# Patient Record
Sex: FEMALE | Race: WHITE | NOT HISPANIC OR LATINO | Employment: FULL TIME | URBAN - METROPOLITAN AREA
[De-identification: names, ages, dates, MRNs, and addresses within clinical notes are randomized per-mention and may not be internally consistent; named-entity substitution may affect disease eponyms.]

---

## 2017-08-01 ENCOUNTER — HOSPITAL ENCOUNTER (OUTPATIENT)
Dept: MAMMOGRAPHY | Facility: CLINIC | Age: 47
Discharge: HOME | End: 2017-08-01
Attending: OBSTETRICS & GYNECOLOGY | Admitting: OBSTETRICS & GYNECOLOGY
Payer: COMMERCIAL

## 2017-08-01 DIAGNOSIS — Z12.31 VISIT FOR SCREENING MAMMOGRAM: ICD-10-CM

## 2017-08-01 PROCEDURE — G0202 SCR MAMMO BI INCL CAD: HCPCS

## 2017-08-07 ENCOUNTER — HOSPITAL ENCOUNTER (OUTPATIENT)
Dept: MAMMOGRAPHY | Facility: CLINIC | Age: 47
Discharge: HOME | End: 2017-08-07
Attending: OBSTETRICS & GYNECOLOGY | Admitting: OBSTETRICS & GYNECOLOGY
Payer: COMMERCIAL

## 2017-08-07 ENCOUNTER — HOSPITAL ENCOUNTER (OUTPATIENT)
Dept: MAMMOGRAPHY | Facility: CLINIC | Age: 47
End: 2017-08-07
Attending: OBSTETRICS & GYNECOLOGY
Payer: COMMERCIAL

## 2017-08-07 DIAGNOSIS — R92.8 ABNORMAL MAMMOGRAM: ICD-10-CM

## 2017-08-07 PROCEDURE — G0279 TOMOSYNTHESIS, MAMMO: HCPCS

## 2017-08-07 PROCEDURE — 76642 ULTRASOUND BREAST LIMITED: CPT | Mod: LT

## 2019-06-21 ENCOUNTER — HOSPITAL ENCOUNTER (OUTPATIENT)
Dept: MAMMOGRAPHY | Facility: CLINIC | Age: 49
Discharge: HOME | End: 2019-06-21
Attending: OBSTETRICS & GYNECOLOGY | Admitting: OBSTETRICS & GYNECOLOGY
Payer: COMMERCIAL

## 2019-06-21 DIAGNOSIS — Z12.31 VISIT FOR SCREENING MAMMOGRAM: ICD-10-CM

## 2019-06-21 PROCEDURE — 77063 BREAST TOMOSYNTHESIS BI: CPT

## 2020-10-15 ENCOUNTER — HOSPITAL ENCOUNTER (OUTPATIENT)
Dept: MAMMOGRAPHY | Facility: CLINIC | Age: 50
Discharge: HOME | End: 2020-10-15
Attending: OBSTETRICS & GYNECOLOGY | Admitting: OBSTETRICS & GYNECOLOGY
Payer: COMMERCIAL

## 2020-10-15 DIAGNOSIS — Z12.31 SCREENING MAMMOGRAM, ENCOUNTER FOR: ICD-10-CM

## 2020-10-15 PROCEDURE — 77067 SCR MAMMO BI INCL CAD: CPT

## 2023-01-24 ENCOUNTER — LAB REQUISITION (OUTPATIENT)
Dept: LAB | Facility: CLINIC | Age: 53
End: 2023-01-24

## 2023-01-24 DIAGNOSIS — Z13.9 ENCOUNTER FOR SCREENING, UNSPECIFIED: ICD-10-CM

## 2023-01-24 LAB
BASOPHILS # BLD AUTO: 0 10E3/UL (ref 0–0.2)
BASOPHILS NFR BLD AUTO: 0 %
EOSINOPHIL # BLD AUTO: 0.2 10E3/UL (ref 0–0.7)
EOSINOPHIL NFR BLD AUTO: 3 %
ERYTHROCYTE [DISTWIDTH] IN BLOOD BY AUTOMATED COUNT: 12 % (ref 10–15)
HCT VFR BLD AUTO: 41.3 % (ref 35–47)
HGB BLD-MCNC: 13.6 G/DL (ref 11.7–15.7)
IMM GRANULOCYTES # BLD: 0 10E3/UL
IMM GRANULOCYTES NFR BLD: 0 %
LYMPHOCYTES # BLD AUTO: 2.7 10E3/UL (ref 0.8–5.3)
LYMPHOCYTES NFR BLD AUTO: 38 %
MCH RBC QN AUTO: 30 PG (ref 26.5–33)
MCHC RBC AUTO-ENTMCNC: 32.9 G/DL (ref 31.5–36.5)
MCV RBC AUTO: 91 FL (ref 78–100)
MONOCYTES # BLD AUTO: 0.5 10E3/UL (ref 0–1.3)
MONOCYTES NFR BLD AUTO: 8 %
NEUTROPHILS # BLD AUTO: 3.5 10E3/UL (ref 1.6–8.3)
NEUTROPHILS NFR BLD AUTO: 51 %
NRBC # BLD AUTO: 0 10E3/UL
NRBC BLD AUTO-RTO: 0 /100
PLATELET # BLD AUTO: 318 10E3/UL (ref 150–450)
RBC # BLD AUTO: 4.54 10E6/UL (ref 3.8–5.2)
WBC # BLD AUTO: 6.9 10E3/UL (ref 4–11)

## 2023-01-24 PROCEDURE — 80053 COMPREHEN METABOLIC PANEL: CPT | Performed by: OBSTETRICS & GYNECOLOGY

## 2023-01-24 PROCEDURE — 85025 COMPLETE CBC W/AUTO DIFF WBC: CPT | Performed by: OBSTETRICS & GYNECOLOGY

## 2023-01-25 LAB
ALBUMIN SERPL BCG-MCNC: 4.5 G/DL (ref 3.5–5.2)
ALP SERPL-CCNC: 59 U/L (ref 35–104)
ALT SERPL W P-5'-P-CCNC: 18 U/L (ref 10–35)
ANION GAP SERPL CALCULATED.3IONS-SCNC: 12 MMOL/L (ref 7–15)
AST SERPL W P-5'-P-CCNC: 27 U/L (ref 10–35)
BILIRUB SERPL-MCNC: 0.4 MG/DL
BUN SERPL-MCNC: 11.2 MG/DL (ref 6–20)
CALCIUM SERPL-MCNC: 9.3 MG/DL (ref 8.6–10)
CHLORIDE SERPL-SCNC: 103 MMOL/L (ref 98–107)
CREAT SERPL-MCNC: 0.7 MG/DL (ref 0.51–0.95)
DEPRECATED HCO3 PLAS-SCNC: 24 MMOL/L (ref 22–29)
GFR SERPL CREATININE-BSD FRML MDRD: >90 ML/MIN/1.73M2
GLUCOSE SERPL-MCNC: 77 MG/DL (ref 70–99)
POTASSIUM SERPL-SCNC: 3.9 MMOL/L (ref 3.4–5.3)
PROT SERPL-MCNC: 6.8 G/DL (ref 6.4–8.3)
SODIUM SERPL-SCNC: 139 MMOL/L (ref 136–145)

## 2023-02-06 ENCOUNTER — HOSPITAL ENCOUNTER (OUTPATIENT)
Dept: MAMMOGRAPHY | Facility: CLINIC | Age: 53
Discharge: HOME | End: 2023-02-06
Attending: OBSTETRICS & GYNECOLOGY | Admitting: OBSTETRICS & GYNECOLOGY
Payer: COMMERCIAL

## 2023-02-06 DIAGNOSIS — Z12.31 VISIT FOR SCREENING MAMMOGRAM: ICD-10-CM

## 2023-02-06 PROCEDURE — 77067 SCR MAMMO BI INCL CAD: CPT

## 2023-02-28 ENCOUNTER — LAB REQUISITION (OUTPATIENT)
Dept: LAB | Facility: CLINIC | Age: 53
End: 2023-02-28

## 2023-02-28 DIAGNOSIS — R87.810 CERVICAL HIGH RISK HUMAN PAPILLOMAVIRUS (HPV) DNA TEST POSITIVE: ICD-10-CM

## 2023-02-28 PROCEDURE — 88305 TISSUE EXAM BY PATHOLOGIST: CPT | Performed by: PATHOLOGY

## 2023-03-02 LAB
PATH REPORT.COMMENTS IMP SPEC: NORMAL
PATH REPORT.COMMENTS IMP SPEC: NORMAL
PATH REPORT.FINAL DX SPEC: NORMAL
PATH REPORT.GROSS SPEC: NORMAL
PATH REPORT.MICROSCOPIC SPEC OTHER STN: NORMAL
PATH REPORT.RELEVANT HX SPEC: NORMAL
PHOTO IMAGE: NORMAL

## 2023-04-16 ENCOUNTER — HEALTH MAINTENANCE LETTER (OUTPATIENT)
Age: 53
End: 2023-04-16

## 2024-02-22 ENCOUNTER — HOSPITAL ENCOUNTER (OUTPATIENT)
Dept: MAMMOGRAPHY | Facility: CLINIC | Age: 54
Discharge: HOME | End: 2024-02-22
Attending: OBSTETRICS & GYNECOLOGY | Admitting: OBSTETRICS & GYNECOLOGY
Payer: COMMERCIAL

## 2024-02-22 DIAGNOSIS — Z12.31 VISIT FOR SCREENING MAMMOGRAM: ICD-10-CM

## 2024-02-22 PROCEDURE — 77063 BREAST TOMOSYNTHESIS BI: CPT

## 2024-02-27 ENCOUNTER — TRANSFERRED RECORDS (OUTPATIENT)
Dept: HEALTH INFORMATION MANAGEMENT | Facility: CLINIC | Age: 54
End: 2024-02-27

## 2024-02-27 ENCOUNTER — LAB REQUISITION (OUTPATIENT)
Dept: LAB | Facility: CLINIC | Age: 54
End: 2024-02-27
Payer: COMMERCIAL

## 2024-02-27 ENCOUNTER — PATIENT OUTREACH (OUTPATIENT)
Dept: ONCOLOGY | Facility: CLINIC | Age: 54
End: 2024-02-27
Payer: COMMERCIAL

## 2024-02-27 ENCOUNTER — TRANSCRIBE ORDERS (OUTPATIENT)
Dept: OTHER | Age: 54
End: 2024-02-27

## 2024-02-27 DIAGNOSIS — Z01.419 ENCOUNTER FOR GYNECOLOGICAL EXAMINATION (GENERAL) (ROUTINE) WITHOUT ABNORMAL FINDINGS: ICD-10-CM

## 2024-02-27 DIAGNOSIS — Z80.3 FAMILY HISTORY OF MALIGNANT NEOPLASM OF BREAST: Primary | ICD-10-CM

## 2024-02-27 DIAGNOSIS — Z13.220 ENCOUNTER FOR SCREENING FOR LIPOID DISORDERS: ICD-10-CM

## 2024-02-27 LAB
CHOLEST SERPL-MCNC: 234 MG/DL
FASTING STATUS PATIENT QL REPORTED: YES
HDLC SERPL-MCNC: 50 MG/DL
LDLC SERPL CALC-MCNC: 156 MG/DL
NONHDLC SERPL-MCNC: 184 MG/DL
TRIGL SERPL-MCNC: 141 MG/DL

## 2024-02-27 PROCEDURE — 80061 LIPID PANEL: CPT | Mod: ORL | Performed by: OBSTETRICS & GYNECOLOGY

## 2024-02-27 PROCEDURE — 87624 HPV HI-RISK TYP POOLED RSLT: CPT | Mod: ORL | Performed by: OBSTETRICS & GYNECOLOGY

## 2024-02-27 PROCEDURE — G0145 SCR C/V CYTO,THINLAYER,RESCR: HCPCS | Mod: ORL | Performed by: OBSTETRICS & GYNECOLOGY

## 2024-02-27 NOTE — PROGRESS NOTES
Writer received Cancer Risk Management Program referral, referred for:    Family history of malignant neoplasm of breast      Reviewed for appropriate plan, and sent to New Patient Scheduling for completion.

## 2024-03-03 LAB
BKR LAB AP GYN ADEQUACY: NORMAL
BKR LAB AP GYN INTERPRETATION: NORMAL
BKR LAB AP GYN OTHER FINDINGS: NORMAL
BKR LAB AP HPV REFLEX: NORMAL
BKR LAB AP LMP: NORMAL
BKR LAB AP PREVIOUS ABNL DX: NORMAL
BKR LAB AP PREVIOUS ABNORMAL: NORMAL
PATH REPORT.COMMENTS IMP SPEC: NORMAL
PATH REPORT.COMMENTS IMP SPEC: NORMAL
PATH REPORT.RELEVANT HX SPEC: NORMAL

## 2024-03-05 LAB
HUMAN PAPILLOMA VIRUS 16 DNA: NEGATIVE
HUMAN PAPILLOMA VIRUS 18 DNA: NEGATIVE
HUMAN PAPILLOMA VIRUS FINAL DIAGNOSIS: NORMAL
HUMAN PAPILLOMA VIRUS OTHER HR: NEGATIVE

## 2024-06-22 ENCOUNTER — HEALTH MAINTENANCE LETTER (OUTPATIENT)
Age: 54
End: 2024-06-22

## 2024-08-23 NOTE — TELEPHONE ENCOUNTER
RECORDS STATUS - RISK MANAGEMENT      RECORDS RECEIVED FROM: [facility]   NOTES STATUS DETAILS   OFFICE NOTE from referring provider Req 08/23-[facility] Dr. Estefany Natarajan   MEDICATION LIST     LABS     PATHOLOGY REPORTS     ANYTHING RELATED TO DIAGNOSIS Epic Most recent 02/27/24   IMAGING (NEED IMAGES & REPORT)     MAMMOS PACS 02/22/24-09/09/11   ULTRASOUND PACS 08/07/17: [facility]

## 2024-08-26 ENCOUNTER — VIRTUAL VISIT (OUTPATIENT)
Dept: ONCOLOGY | Facility: CLINIC | Age: 54
End: 2024-08-26
Attending: OBSTETRICS & GYNECOLOGY
Payer: COMMERCIAL

## 2024-08-26 ENCOUNTER — PRE VISIT (OUTPATIENT)
Dept: ONCOLOGY | Facility: CLINIC | Age: 54
End: 2024-08-26
Payer: COMMERCIAL

## 2024-08-26 DIAGNOSIS — Z80.3 FAMILY HISTORY OF MALIGNANT NEOPLASM OF BREAST: Primary | ICD-10-CM

## 2024-08-26 DIAGNOSIS — Z84.81 FAMILY HISTORY OF CARRIER OF GENETIC DISEASE: ICD-10-CM

## 2024-08-26 DIAGNOSIS — Z80.0 FAMILY HISTORY OF MALIGNANT NEOPLASM OF PANCREAS: ICD-10-CM

## 2024-08-26 PROCEDURE — 96040 HC GENETIC COUNSELING, EACH 30 MINUTES: CPT | Mod: GT,95 | Performed by: GENETIC COUNSELOR, MS

## 2024-08-26 NOTE — PATIENT INSTRUCTIONS
Assessing Cancer Risk  Cancer is a common diagnosis which impacts many families.  Individuals may develop cancer due to environmental factors (such as exposures and lifestyle), aging, genetic predisposition, or a combination of these factors.      Only about 5-10% of cancers are thought to be due to an inherited cancer susceptibility gene.    These families often have:  Several people with the same or related types of cancer  Cancers diagnosed at a young age (before age 50)  Individuals with more than one primary cancer  Multiple generations of the family affected with cancer    Comprehensive Breast and Gynecologic Cancer Panel  We each inherit two copies of every gene in our bodies: one from our mother, and one from our father. Each gene has a specific job to do.  When a gene has a mistake or  mutation  in it, it does not work like it should.     Some people may be candidates for genetic testing of more than one gene.  For these families, genetic testing using a cancer panel may be offered. These panels will test different genes at once known to increase the risk for breast, ovarian, uterine, and/or other cancers.    This handout will review common hereditary breast and gynecologic cancer syndromes. The genes that will be discussed in this handout are: ASHWINI, BRCA1, BRCA2, BRIP1, CDH1, CHEK2, MLH1, MSH2, MSH6, PMS2, EPCAM, PTEN, PALB2, RAD51C, RAD51D, and TP53.    The purpose of this handout is to serve as a brief summary of the breast and gynecologic cancer risk genes that have published clinical management guidelines for individuals who are found to carry a mutation. Inheriting a mutation does not mean a person will develop cancer, but it does significantly increase their risk above the general population risk.     ______________________________________________________________________________    Hereditary Breast and Ovarian Cancer Syndrome (BRCA1 and BRCA2)  A single mutation in one of the copies of BRCA1 or  BRCA2 increases the risk for breast and ovarian cancer, among others.  The risk for pancreatic cancer and melanoma may also be slightly increased in some families.  The chart below shows the chance that someone with a BRCA mutation would develop cancer in his or her lifetime1,2,3,4.       Lifetime Cancer Risks    General Population BRCA1  BRCA2   Breast  12% >60% >60%   Ovarian  1-2% 39-58% 13-29%   Prostate 12% 7-26% 19-61%   Male Breast 0.1% 0.2-1.2% 1.8-7.1%   Pancreas 1-2% Up to 5% 5-10%     A person s ethnic background is also important to consider, as individuals of Ashkenazi Taoist ancestry have a higher chance of having a BRCA gene mutation.  There are three BRCA mutations that occur more frequently in this population.      Weems Syndrome (MLH1, MSH2, MSH6, PMS2, and EPCAM)  Currently five genes are known to cause Weems Syndrome: MLH1, MSH2, MSH6, PMS2, and EPCAM.  A single mutation in one of the Weems Syndrome genes increases the risk for colon, endometrial, ovarian, and stomach cancers.  Other cancers that occur less commonly in Weems Syndrome include urinary tract, skin, and brain cancers.  The chart below shows the chance that a person with Weems syndrome would develop cancer in his or her lifetime5.      Lifetime Cancer Risks    General Population Weems Syndrome   Colon 5% 10-61%   Endometrial 3% 13-57%   Ovarian 1-2% 1-38%   Stomach <1% 1-9%   *Cancer risk varies depending on Weems syndrome gene found      Cowden Syndrome (PTEN)  Cowden syndrome is a hereditary condition that increases the risk for breast, thyroid, endometrial, colon, and kidney cancer.  Cowden syndrome is caused by a mutation in the PTEN gene.  A single mutation in one of the copies of PTEN causes Cowden syndrome and increases cancer risk.  The chart below shows the chance that someone with a PTEN mutation would develop cancer in their lifetime6,7.  Other benign features seen in some individuals with Cowden syndrome include benign  skin lesions (facial papules, keratoses, lipomas), learning disability, autism, thyroid nodules, colon polyps, and larger head size.     Lifetime Cancer Risks    General Population Cowden   Breast 12% 40-60%*   Thyroid 1% Up to 38%   Renal 1-2% Up to 35%   Endometrial 3% Up to 28%   Colon 5% Up to 9%   Melanoma 2-3% Up to 6%   *Emerging data suggests the risk for breast cancer could be greater than 60%               Li-Fraumeni Syndrome (TP53)  Li-Fraumeni Syndrome (LFS) is a cancer predisposition syndrome caused by a mutation in the TP53 gene. A single mutation in one of the copies of TP53 increases the risk for multiple cancers. Individuals with LFS are at an increased risk for developing cancer at a young age. The lifetime risk for development of a LFS-associated cancer is 50% by age 30 and 90% by age 60.   Core Cancers: Sarcomas, Breast, Brain, Lung, Leukemias/Lymphomas, Adrenocortical carcinomas  Other Cancers: Gastrointestinal, Thyroid, Skin, Genitourinary       Hereditary Diffuse Gastric Cancer (CDH1)  Currently, one gene is known to cause hereditary diffuse gastric cancer (HDGC): CDH1.  Individuals with HDGC are at increased risk for diffuse gastric cancer and lobular breast cancer. Of people diagnosed with HDGC, 30-50% have a mutation in the CDH1 gene.  This suggests there are likely other genes that may cause HDGC that have not been identified yet.      Lifetime Cancer Risks    General Population HDGC   Diffuse Gastric  <1% ~80%   Breast 12% 41-60%       Additional Genes    ASHWINI  ASHWINI is a moderate-risk breast cancer gene. Women who have a mutation in ASHWINI can have between a 2-4 fold increased risk for breast cancer compared to the general population8. ASHWINI mutations have also been associated with increased risk for pancreatic cancer between 5-10%9. Individuals who inherit two ASHWINI mutations have a condition called ataxia-telangiectasia (AT).  This rare autosomal recessive condition affects the nervous system  and immune system, and is associated with progressive cerebellar ataxia beginning in childhood. Individuals with ataxia-telangiectasia often have a weakened immune system and have an increased risk for childhood cancers.    PALB2  Mutations in PALB2 have been shown to increase the risk of breast cancer up to 41-60% in some families; where individuals fall within this risk range is dependent upon family [id number]. PALB2 mutations have also been associated with increased risk for pancreatic cancer between 5-10%.  Individuals who inherit two PALB2 mutations--one from their mother and one from their father--have a condition called Fanconi Anemia.  This rare autosomal recessive condition is associated with short stature, developmental delay, bone marrow failure, and increased risk for childhood cancers.    CHEK2   CHEK2 is a moderate-risk breast cancer gene.  Women who have a mutation in CHEK2 have around a 2-4 fold increased risk for breast cancer compared to the general population, and this risk may be higher depending upon family history.11,12,13 The risk of colon cancer may be twice as high as the general population risk of colon cancer of 5%. Mutations in CHEK2 have also been shown to increase the risk of other cancers, including prostate, however these cancer risks are currently not well understood.    BRIP1, RAD51C and RAD51D  Mutations in RAD51C and RAD51D have been shown to increase the risk of ovarian cancer and breast cancer 14,. Mutations in BRIP1 have been shown to increase the risk of ovarian cancer and possibly female breast cancer 15 .       Lifetime Cancer Risk    General Population        BRIP1   RAD51C  RAD51D   Breast 12% Not well defined 20-40% 20-40%   Ovarian 1-2% 5-15% 10-15% 10-20%     ______________________________________________________________  Inheritance  All of the cancer syndromes reviewed above are inherited in an autosomal dominant pattern.  This means that if a parent has a mutation,  each of their children will have a 50% chance of inheriting that same mutation. Therefore, each child --male or female-- would have a 50% chance of being at increased risk for developing cancer.    Image obtained from Genetics Home Reference, 2013     Mutations in some genes can occur de veronica, which means that a person s mutation occurred for the first time in them and was not inherited from a parent.  Now that they have the mutation, however, it can be passed on to future generations.    Genetic Testing  Genetic testing involves a blood test and will look for any harmful mutations that are associated with increased cancer risk.  If possible, it is recommended that the person(s) who has had cancer be tested before other family members.  That person will give us the most useful information about whether or not a specific gene is associated with the cancer in the family.    Results  There are three possible results of genetic testing:  Positive--a harmful mutation was identified in one or more of the genes  Negative--no mutations were identified in any of the genes tested  Variant of unknown significance--a variation in one of the genes was identified, but it is unclear how this impacts cancer risk in the family    Advantages and Disadvantages   There are advantages and disadvantages to genetic testing.    Advantages  May clarify your cancer risk  Can help you make medical decisions  May explain the cancers in your family  May give useful information to your family members (if you share your results)    Disadvantages  Possible negative emotional impact of learning about inherited cancer risk  Uncertainty in interpreting a negative test result in some situations  Possible genetic discrimination concerns (see below)    Genetic Information Nondiscrimination Act (GEO)  The Genetic Information Nondiscrimination Act of 2008 (GEO) is a federal law that protects individuals from health insurance or employment discrimination  based on a genetic test result alone (with some exceptions, including employers with fewer than 15 employees, and ).  Although rare, GEO  does not cover discrimination protections in terms of life insurance, long term care, or disability insurances.  Visit the National Human [employer] [place] website to learn more.    Reducing Cancer Risk  All of the genes described in this handout have nationally recognized cancer screening guidelines that would be recommended for individuals who test positive.  In addition to increased cancer screening, surgeries may be offered or recommended to reduce cancer risk.  Recommendations are based upon an individual s genetic test result as well as their personal and family history of cancer.    Questions to Think About Regarding Genetic Testing:  What effect will the test result have on me and my relationship with my family members if I have an inherited gene mutation?  If I don t have a gene mutation?  Should I share my test results, and how will my family react to this news, which may also affect them?  Are my children ready to learn new information that may one day affect their own health?    Hereditary Cancer Resources    FORCE: Facing Our Risk of Cancer Empowered facingourrisk.org   Bright Pink bebrightpink.org   Li-Fraumeni Syndrome Association lfsassociation.org   PTEN World PTENworld.com   No stomach for cancer, Inc. nostomachforcancer.org   Stomach cancer relief network Scrnet.org   Collaborative Group of the Americas on Inherited Colorectal Cancer (CGA) cgaicc.com    Cancer Care cancercare.org   American Cancer Society (ACS) cancer.org   National Cancer [place] (NCI) cancer.gov     Please call us if you have any questions or concerns.   Cancer Risk Management Program [phone]-CANCER ([phone])  Tonio Neal, MS JD [facility]  [phone]  Katty Bautista, MS, JD [facility] [phone]  Stacy Sharma, MS, JD [facility]  [phone]  Emi Rose, MS, JD [facility]  [phone]  Joanna Dee,  MS, [facility]  [phone]  Jamee Haas, MS, [facility] [phone]  Vandana Ramirez, MS, [facility] [phone]    References  Radha Hayden PDP, Donte S, Karie ANTOINE, Sylwia JE, Miguel Ángel JL, cSot N, Shan H, Harika O, Herlinda A, Pasini B, Radimatilde P, Manvan S, Ronnie DM, Serra N, Rafaela E, Gypsy H, Ng E, Joanne J, Grongarrett J, Corry B, Tulinius H, Thorlacius S, Eerola H, Nevanlinna H, Daly K, Talia OP. Average risks of breast and ovarian cancer associated with BRCA1 or BRCA2 mutations detected in case series unselected for family history: a combined analysis of 222 studies. Am J Hum [place]. 2003;72:1117-30.  Lima N, Erica M, Asaf G.  BRCA1 and BRCA2 Hereditary Breast and Ovarian Cancer. Gene Reviews online. 2013.  Kelvin YC, Griffin S, Terri G, Car S. Breast cancer risk among male BRCA1 and BRCA2 mutation carriers. J Natl Cancer Inst. 2007;99:1811-4.  Yan GODFREY, Chriss I, Osvaldo J, Jabari E, Nessa ER, Diogenes F. Risk of breast cancer in male BRCA2 carriers. J Med [place]. 2010;47:710-1.  National Comprehensive Cancer Network. Clinical practice guidelines in oncology, colorectal cancer screening. Available online (registration required). 2015.  Devin MH, Tamiko J, Shun J, Rodrigo SANCHEZ, Ana MS, Eng C. Lifetime cancer risks in individuals with germline PTEN mutations. Clin Cancer Res. 2012;18:400-7.  Reynaldo R. Cowden Syndrome: A Critical Review of the Clinical Literature. J Roseanna . 2009:18:13-27.  Mirna BARBOSA, Alfred LANDIN, Rodriguez S, Wendy P, Jose T, Danilo M, Ozzie B, Mariola H, Elzbieta R, Jigar K, Moo L, Yan GODFREY, Ronnie LANDIN, Yovany DF, Maddy MR, The Breast Cancer Susceptibility Collaboration (UK) & Chris GLOVER. ASHWINI mutations that cause ataxia-telangiectasia are breast cancer susceptibility alleles. Nature Genetics. 2006;38:873-875  Ran N , Alexandra Y, Nely J, Daniel L, Edmund HARDWICK , Syl ML, Raul S, Mila AG, Elisabet S, Gonzalez ML, Roberta J , Michelle R, Dioni MILLER, Carlo  JR, Matilde VE, Jasper M, Vorenaystein B, Lavinia N, Louis RH, Mason KW, and Francis AP. ASHWINI mutations in patients with hereditary pancreatic cancer. Cancer Discover. 2012;2:41-46  Tee PEREZ., et al. Breast-Cancer Risk in Families with Mutations in PALB2. NEJM. 2014; 371(6):497-506.  CHEK2 Breast Cancer Case-Control Consortium. CHEK2*1100delC and susceptibility to breast cancer: A collaborative analysis involving 10,860 breast cancer cases and 9,065 controls from 10 studies. Am J Hum [place], 74 (2004), pp. 8960-9535  Bethany T, Leodan S, Tabitha K, et al. Spectrum of Mutations in BRCA1, BRCA2, CHEK2, and TP53 in Families at High Risk of Breast Cancer. LEONARDO. 2006;295(12):8120-9416.   Allison C, Walter D, Mingo BARBOSA, et al. Risk of breast cancer in women with a CHEK2 mutation with and without a family history of breast cancer. J Clin Oncol. 2011;29:0816-8808.  Song H, Devens E, Ramus SJ, et al. Contribution of germline mutations in the RAD51B, RAD51C, and RAD51D genes to ovarian cancer in the population. J Clin Oncol. 2015;33(26):7491-8995. Doi:10.1200/JCO.2015.61.2408.  Coral T, Roselyn DF, Ruben P, et al. Mutations in BRIP1 confer high risk of ovarian cancer. Antonia Roseanna. 2011;43(11):8460-6159. doi:10.1038/ng.955.

## 2024-08-26 NOTE — LETTER
2024    Kenia Reyes  [address] [place] [address]      Dear Kenia,    It was a pleasure speaking with you over video on 2024. Here is a copy of the progress note from our discussion. If you have any additional questions, please feel free to call.    Referring Provider: Estefany Natarajan MD    Presenting Information:   I met with Kenia for her video genetic counseling visit, through the Cancer Risk Management Program, to discuss her family history of breast and pancreatic cancer, along with the known CHEK2 mutation. Today we reviewed this history, cancer screening recommendations, and available genetic testing options.    Personal History:  Kenia is a 53 year old year old female. She does not have any personal history of cancer. She had her first menstrual period at age 13, her first child at age 33, and is premenopausal. Kenia has her ovaries, fallopian tubes and uterus in place, and she has had no ovarian cancer screening to date. She reports that she has not used hormone replacement therapy.      She has regular clinical breast exams and mammograms; her most recent mammogram in 2024 was normal. Kenia reported beginning having colonoscopies at the age of 50. Her most recent colonoscopy was reported around 2021 was normal and follow-up was recommended in 10 years. She does not regularly do any other cancer screening at this time.     Family History: (Please see scanned pedigree for detailed family history information)  Kenia's paternal half-sister was diagnosed with breast cancer at age 37. 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 I received a family letter that explained this result, so I do not know what other genes she was tested for.  Kenia's father was diagnosed with non-Hodgkin's lymphoma at age 45 and  at age 49.  A maternal aunt was diagnosed with breast cancer at age 50 and pancreatic cancer  at age 62.  There is no other reported family history of cancer on either side of the family, however, her father was adopted and there is no information about his biological family.  Her maternal ethnicity is [place] and [place]. Her paternal ethnicity is White. There is no known Ashkenazi Taoism ancestry on either side of her family. There is no reported consanguinity.    Discussion:  Kenia's family history of breast and pancreatic cancer is suggestive of a hereditary cancer syndrome.  We reviewed the features of sporadic, familial, and hereditary cancers. We discussed that mutations in either BRCA1 or BRCA2 could be possible hereditary explanations for her family history of cancer. Mutations in the BRCA1 or BRCA2 gene are known to cause Hereditary Breast and Ovarian Cancer Syndrome (HBOC). HBOC typically presents with multiple family members diagnosed with breast cancer before age 50 and/or ovarian cancer. Other cancer risks associated with HBOC include male breast cancer, prostate cancer, pancreatic cancer, and melanoma.   Of note, Kenia's paternal half sister was found to have a CHEK2 mutation. 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 Kenia stated that this sister reported that this is coming from their father. I asked further about if her half-sister's mother had genetic testing to prove that she did not have the mutation, but Kenia was not sure. She just knew that her sister reported that this is coming from their father. Given this, Kenia would have a 50% chance of inheriting the same CHEK2 mutation.  We discussed the natural history and genetics of hereditary cancer. A detailed handout regarding hereditary cancer, along with the other information we discussed, will be mailed to Kenia at the end of our appointment today and can be found in the after visit summary. Topics included: inheritance pattern, cancer risks, cancer screening recommendations, and also risks, benefits and limitations of testing.  Based on  her personal and family history, Kenia meets current National Comprehensive Cancer Network (NCCN) criteria for genetic testing of CHEK2.  Based on her personal and family history, Kenia meets current National Comprehensive Cancer Network (NCCN) criteria for genetic testing of BRCA1/2 along with other high-penetrance breast cancer susceptibility genes (I.e. CDH1, PALB2, PTEN, and TP53) given her maternal aunt's breast cancer diagnosis.  We discussed that there are additional genes that could cause increased risk for breast and/or pancreatic cancer. As many of these genes present with overlapping features in a family and accurate cancer risk cannot always be established based upon the pedigree analysis alone, it would be reasonable for Kenia to consider panel genetic testing to analyze multiple genes at once.  Genetic testing is available for 48 genes associated with hereditary cancer: Common Hereditary Cancers panel (APC, ASHWINI, AXIN2, BAP1, BARD1, BMPR1A, BRCA1, BRCA2, BRIP1, CDH1, CDK4, CDKN2A, CHEK2, CTNNA1, DICER1, EPCAM, FH, GREM1, HOXB13, KIT, MBD4, MEN1, MLH1, MSH2, MSH3, MSH6, MUTYH, NF1, NTHL1, PALB2, PDGFRA, PMS2, POLD1, POLE, PTEN, RAD51C, RAD51D, SDHA, SDHB, SDHC, SDHD, SMAD4, SMARCA4, STK11, TP53, TSC1, TSC2, and VHL).  We discussed that many of the genes in the Common Hereditary Cancers panel are associated with specific hereditary cancer syndromes and published management guidelines: Hereditary Breast and Ovarian Cancer syndrome (BRCA1, BRCA2), Weems syndrome (MLH1, MSH2, MSH6, PMS2, EPCAM), Familial Adenomatous Polyposis (APC), Hereditary Diffuse Gastric Cancer (CDH1), Familial Atypical Multiple Mole Melanoma syndrome (CDK4, CDKN2A), Juvenile Polyposis syndrome (BMPR1A, SMAD4), Cowden syndrome (PTEN), Li Fraumeni syndrome (TP53), Peutz-Jeghers syndrome (STK11), MUTYH Associated Polyposis (MUTYH), Tuberous Sclerosis complex (TSC1, TSC2), Neurofibromatosis type 1 (NF1), Hereditary Leiomyomatosis and Renal  Cell Carcinoma (FH), Multiple Endocrine Neoplasia type 1 (MEN1), Hereditary Paraganglioma and Pheochromocytoma (SDHA, SDHB, SDHC, SDHD), and von Hippel-Lindau (VHL).   The ASHWINI, AXIN2, BRIP1, CHEK2, GREM1, MSH3, NTHL1, PALB2, POLD1, POLE, RAD51C, and RAD51D genes are associated with increased cancer risk and have published management guidelines for certain cancers.    The remaining genes (BAP1, BARD1, CTNNA1, DICER1, HOXB13, KIT, MBD4, PDGFRA, and SMARCA4) are associated with increased cancer risk and may allow us to make medical recommendations when mutations are identified.    Kenia stated that she would prefer to submit a saliva kit for her genetic testing. [facility] will send a kit directly to her home with directions on how to collect a saliva sample. We discussed that there is a small chance for sample failure due to contamination of the sample. To help minimize this, she should follow the directions that are sent with the kit. Kenia verbalized understanding of this. Once the sample is collected, she will send it to [employer] using the return envelope and prepaid shipping label.   Verbal consent was given over video and written on the consent form. Turnaround time is approximately 4 weeks once the lab receives the sample.  Should Kenia have any questions regarding the billing for her genetic testing, she should contact [facility]'s billing department at [email or web address] or call them at [phone].  The possible outcomes of positive, negative, and uncertain were discussed with Kenia. A detailed handout that explains this in detail was provided to the patient.  Medical Management: For Kenia, we reviewed that the information from genetic testing may determine:  additional cancer screening for which Kenia may qualify (i.e. mammogram and breast MRI, more frequent colonoscopies, more frequent dermatologic exams, etc.),  options for risk reducing surgeries Kenia could consider (i.e. bilateral mastectomy, surgery to  remove her ovaries and/or uterus, etc.),    and targeted chemotherapies if she were to develop certain cancers in the future (i.e. immunotherapy for individuals with Weems syndrome, PARP inhibitors, etc.).   These recommendations and possible targeted chemotherapies will be discussed in detail once genetic testing is completed.     Plan:  1) Today Kenia elected to proceed with the Common Hereditary Cancers panel through [facility].  2) A copy of the consent form and the after visit summary will be sent to Kenia.  3) This information should be available in approximately 4 weeks, once the lab receives the sample.  4) I will call Kenia with the results once they become available.    Time spent on video: 38 minutes    Tonio Neal MS, [facility]  Licensed, Certified Genetic Counselor

## 2024-08-26 NOTE — Clinical Note
"8/26/2024      Kenia Reyes  [address] [place] [address]      Dear Colleague,    Thank you for referring your patient, Kenia Reyes, to the [facility]. Please see a copy of my visit note below.    Virtual Visit Details    Type of service:  Video Visit     Originating Location (pt. Location): {video visit patient location:699508::\"Home\"}  {PROVIDER LOCATION On-site should be selected for visits conducted from your clinic location or adjoining [facility] hospital, academic office, or other nearby [facility] building. Off-site should be selected for all other provider locations, including home:427207}  Distant Location (provider location):  {virtual location provider:554207}  Platform used for Video Visit: {Virtual Visit Platforms:286993::\"[facility]\"}      Again, thank you for allowing me to participate in the care of your patient.        Sincerely,        Tonio Neal, GC  "

## 2024-08-26 NOTE — NURSING NOTE
Current patient location:  [place], [place]      Is the patient currently in the state of [place]? YES    Visit mode:VIDEO    If the visit is dropped, the patient can be reconnected by: VIDEO VISIT: Send to e-mail at: [email or web address]    Will anyone else be joining the visit? NO  (If patient encounters technical issues they should call [phone])    How would you like to obtain your AVS? MyChart    Are changes needed to the allergy or medication list? N/A    Reason for visit: Consult     Leslye CARTY

## 2024-08-26 NOTE — PROGRESS NOTES
2024    Referring Provider: Estefany Natarajan MD    Presenting Information:   I met with Kenia for her video genetic counseling visit, through the Cancer Risk Management Program, to discuss her family history of breast and pancreatic cancer, along with the known CHEK2 mutation. Today we reviewed this history, cancer screening recommendations, and available genetic testing options.    Personal History:  Kenia is a 53 year old year old female. She does not have any personal history of cancer. She had her first menstrual period at age 13, her first child at age 33, and is premenopausal. Kenia has her ovaries, fallopian tubes and uterus in place, and she has had no ovarian cancer screening to date. She reports that she has not used hormone replacement therapy.      She has regular clinical breast exams and mammograms; her most recent mammogram in 2024 was normal. Kenia reported beginning having colonoscopies at the age of 50. Her most recent colonoscopy was reported around 2021 was normal and follow-up was recommended in 10 years. She does not regularly do any other cancer screening at this time.     Family History: (Please see scanned pedigree for detailed family history information)  Kenia's paternal half-sister was diagnosed with breast cancer at age 37. 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 I received a family letter that explained this result, so I do not know what other genes she was tested for.  Kenia's father was diagnosed with non-Hodgkin's lymphoma at age 45 and  at age 49.  A maternal aunt was diagnosed with breast cancer at age 50 and pancreatic cancer at age 62.  There is no other reported family history of cancer on either side of the family, however, her father was adopted and there is no information about his biological family.  Her maternal ethnicity is [place] and [place]. Her paternal ethnicity is White. There  is no known Ashkenazi Baptism ancestry on either side of her family. There is no reported consanguinity.    Discussion:  Kenia's family history of breast and pancreatic cancer is suggestive of a hereditary cancer syndrome.  We reviewed the features of sporadic, familial, and hereditary cancers. We discussed that mutations in either BRCA1 or BRCA2 could be possible hereditary explanations for her family history of cancer. Mutations in the BRCA1 or BRCA2 gene are known to cause Hereditary Breast and Ovarian Cancer Syndrome (HBOC). HBOC typically presents with multiple family members diagnosed with breast cancer before age 50 and/or ovarian cancer. Other cancer risks associated with HBOC include male breast cancer, prostate cancer, pancreatic cancer, and melanoma.   Of note, Kenia's paternal half sister was found to have a CHEK2 mutation. 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 Kenia stated that this sister reported that this is coming from their father. I asked further about if her half-sister's mother had genetic testing to prove that she did not have the mutation, but Kenia was not sure. She just knew that her sister reported that this is coming from their father. Given this, Kenia would have a 50% chance of inheriting the same CHEK2 mutation.  We discussed the natural history and genetics of hereditary cancer. A detailed handout regarding hereditary cancer, along with the other information we discussed, will be mailed to Kenia at the end of our appointment today and can be found in the after visit summary. Topics included: inheritance pattern, cancer risks, cancer screening recommendations, and also risks, benefits and limitations of testing.  Based on her personal and family history, Kenia meets current National Comprehensive Cancer Network (NCCN) criteria for genetic testing of CHEK2.  Based on her personal and family history, Kenia meets current National Comprehensive Cancer Network (NCCN) criteria for genetic testing  of BRCA1/2 along with other high-penetrance breast cancer susceptibility genes (I.e. CDH1, PALB2, PTEN, and TP53) given her maternal aunt's breast cancer diagnosis.  We discussed that there are additional genes that could cause increased risk for breast and/or pancreatic cancer. As many of these genes present with overlapping features in a family and accurate cancer risk cannot always be established based upon the pedigree analysis alone, it would be reasonable for Kenia to consider panel genetic testing to analyze multiple genes at once.  Genetic testing is available for 48 genes associated with hereditary cancer: Common Hereditary Cancers panel (APC, ASHWINI, AXIN2, BAP1, BARD1, BMPR1A, BRCA1, BRCA2, BRIP1, CDH1, CDK4, CDKN2A, CHEK2, CTNNA1, DICER1, EPCAM, FH, GREM1, HOXB13, KIT, MBD4, MEN1, MLH1, MSH2, MSH3, MSH6, MUTYH, NF1, NTHL1, PALB2, PDGFRA, PMS2, POLD1, POLE, PTEN, RAD51C, RAD51D, SDHA, SDHB, SDHC, SDHD, SMAD4, SMARCA4, STK11, TP53, TSC1, TSC2, and VHL).  We discussed that many of the genes in the Common Hereditary Cancers panel are associated with specific hereditary cancer syndromes and published management guidelines: Hereditary Breast and Ovarian Cancer syndrome (BRCA1, BRCA2), Weems syndrome (MLH1, MSH2, MSH6, PMS2, EPCAM), Familial Adenomatous Polyposis (APC), Hereditary Diffuse Gastric Cancer (CDH1), Familial Atypical Multiple Mole Melanoma syndrome (CDK4, CDKN2A), Juvenile Polyposis syndrome (BMPR1A, SMAD4), Cowden syndrome (PTEN), Li Fraumeni syndrome (TP53), Peutz-Jeghers syndrome (STK11), MUTYH Associated Polyposis (MUTYH), Tuberous Sclerosis complex (TSC1, TSC2), Neurofibromatosis type 1 (NF1), Hereditary Leiomyomatosis and Renal Cell Carcinoma (FH), Multiple Endocrine Neoplasia type 1 (MEN1), Hereditary Paraganglioma and Pheochromocytoma (SDHA, SDHB, SDHC, SDHD), and von Hippel-Lindau (VHL).   The ASHWINI, AXIN2, BRIP1, CHEK2, GREM1, MSH3, NTHL1, PALB2, POLD1, POLE, RAD51C, and RAD51D genes are  associated with increased cancer risk and have published management guidelines for certain cancers.    The remaining genes (BAP1, BARD1, CTNNA1, DICER1, HOXB13, KIT, MBD4, PDGFRA, and SMARCA4) are associated with increased cancer risk and may allow us to make medical recommendations when mutations are identified.    Kenia stated that she would prefer to submit a saliva kit for her genetic testing. [facility] will send a kit directly to her home with directions on how to collect a saliva sample. We discussed that there is a small chance for sample failure due to contamination of the sample. To help minimize this, she should follow the directions that are sent with the kit. Kenia verbalized understanding of this. Once the sample is collected, [facility] using the return envelope and prepaid shipping label.   Verbal consent was given over video and written on the consent form. Turnaround time is approximately 4 weeks once the lab receives the sample.  Should Kenia have any questions regarding the billing for her genetic testing, she should contact [facility]'s billing department at [email or web address] or call them at [phone].  The possible outcomes of positive, negative, and uncertain were discussed with Kenia. A detailed handout that explains this in detail was provided to the patient.  Medical Management: For Kenia, we reviewed that the information from genetic testing may determine:  additional cancer screening for which Kenia may qualify (i.e. mammogram and breast MRI, more frequent colonoscopies, more frequent dermatologic exams, etc.),  options for risk reducing surgeries Kenia could consider (i.e. bilateral mastectomy, surgery to remove her ovaries and/or uterus, etc.),    and targeted chemotherapies if she were to develop certain cancers in the future (i.e. immunotherapy for individuals with Weems syndrome, PARP inhibitors, etc.).   These recommendations and possible targeted chemotherapies  will be discussed in detail once genetic testing is completed.     Plan:  1) Today Kenia elected to proceed with the Common Hereditary Cancers panel through [facility].  2) A copy of the consent form and the after visit summary will be sent to Kenia.  3) This information should be available in approximately 4 weeks, once the lab receives the sample.  4) I will call Kenia with the results once they become available.    Time spent on video: 38 minutes    Tonio Neal MS, [facility]  Licensed, Certified Genetic Counselor      Virtual Visit Details    Type of service:  Video Visit     Originating Location (pt. Location): Home  Distant Location (provider location):  Off-site  Platform used for Video Visit: Charis

## 2024-09-30 DIAGNOSIS — Z80.3 FAMILY HISTORY OF MALIGNANT NEOPLASM OF BREAST: Primary | ICD-10-CM

## 2024-09-30 DIAGNOSIS — Z84.81 FAMILY HISTORY OF CARRIER OF GENETIC DISEASE: ICD-10-CM

## 2024-09-30 DIAGNOSIS — Z80.0 FAMILY HISTORY OF MALIGNANT NEOPLASM OF PANCREAS: ICD-10-CM

## 2024-10-18 ENCOUNTER — VIRTUAL VISIT (OUTPATIENT)
Dept: ONCOLOGY | Facility: CLINIC | Age: 54
End: 2024-10-18
Attending: GENETIC COUNSELOR, MS
Payer: COMMERCIAL

## 2024-10-18 ENCOUNTER — PATIENT OUTREACH (OUTPATIENT)
Dept: ONCOLOGY | Facility: CLINIC | Age: 54
End: 2024-10-18

## 2024-10-18 DIAGNOSIS — Z84.81 FAMILY HISTORY OF CARRIER OF GENETIC DISEASE: ICD-10-CM

## 2024-10-18 DIAGNOSIS — Z80.3 FAMILY HISTORY OF MALIGNANT NEOPLASM OF BREAST: ICD-10-CM

## 2024-10-18 DIAGNOSIS — Z80.0 FAMILY HISTORY OF MALIGNANT NEOPLASM OF PANCREAS: ICD-10-CM

## 2024-10-18 DIAGNOSIS — Z15.09 MONOALLELIC MUTATION OF CHEK2 GENE: Primary | ICD-10-CM

## 2024-10-18 PROCEDURE — 999N000069 HC STATISTIC GENETIC COUNSELING, < 16 MIN: Mod: GT,95 | Performed by: GENETIC COUNSELOR, MS

## 2024-10-18 NOTE — NURSING NOTE
Current patient location: [address] [address]    Is the patient currently in the state of [place]? YES    Visit mode:VIDEO    If the visit is dropped, the patient can be reconnected by: VIDEO VISIT: Send to e-mail at: [email or web address]    Will anyone else be joining the visit? NO  (If patient encounters technical issues they should call [phone]    Are changes needed to the allergy or medication list? N/A    Are refills needed on medications prescribed by this physician? NO    Rooming Documentation:  Questionnaire(s) not done per department protocol    Reason for visit: RECHECK    Kayleen BRODYF

## 2024-10-18 NOTE — LETTER
"October 18, 2024    Kenia Reyes  [address] [facility] [address]      Dear Kenia,    It was a pleasure speaking with you over video on 10/18/2024. Here is a copy of the progress note from our discussion. If you have any additional questions, please feel free to call.    Referring Provider: Estefany Natarajan MD    Presenting Information:   I spoke to Kenia by video today to discuss her genetic testing results. A saliva kit was sent to her house on 8/26/24. The Common Hereditary Cancers panel was ordered from [employer]. This testing was done because of Kenia's family history of breast and pancreatic cancer, along with the known CHEK2 mutation in the family..     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 We discussed that this mutation is associated with an increased risk for breast cancer. We discussed the impact of this testing on Kenia in detail.     Of note, Kenia is negative for any pathogenic (harmful) mutations in APC, ASHWINI, AXIN2, BAP1, BARD1, BMPR1A, BRCA1, BRCA2, BRIP1, CDH1, CDK4, CDKN2A, CTNNA1, DICER1, EPCAM, FH, GREM1, HOXB13, KIT, MBD4, MEN1, MLH1, MSH2, MSH3, MSH6, MUTYH, NF1, NTHL1, PALB2, PDGFRA, PMS2, POLD1, POLE, PTEN, RAD51C, RAD51D, SDHA, SDHB, SDHC, SDHD, SMAD4, SMARCA4, STK11, TP53, TSC1, TSC2, and VHL. No pathogenic mutations were found in any of the other 47 genes analyzed. This test involved sequencing and deletion/duplication analysis of all genes with the exceptions of EPCAM and GREM1 (deletions/duplications only) and SDHA (sequencing only).  We reviewed the autosomal dominant inheritance of these genes. Kenia cannot pass on a mutation in any of these genes to her children based on this test result. Mutations in these genes do not skip generations.      A copy of the test report can be found in the Laboratory tab, dated 9/20/24, and named \"LABORATORY MISCELLANEOUS RESULT.\"  The report is " scanned in as a linked document.    Cancer Risks:    Mutations in the CHEK2 gene are associated with a moderate risk of breast cancer. Of note, studies investigating the association between specific CHEK2 variants and cancer risk have been primarily based on the variant 1100delC, which is commonly found in individuals of  ancestry.  The lifetime breast cancer risk for women who carry one CHEK2 mutation is approximately 23-27%, with some studies suggesting the risk could be as high as 40%.  This is compared to the lifetime population risk of breast cancer of 12.8%.  These risks are likely influenced by family history, in which women with a family history of breast cancer are at higher risk than those without.   Based on new data, colorectal cancer is no longer associated with CHEK2 mutations.  We also discussed the possible association of CHEK2 mutations with increased risk for prostate, melanoma, thyroid, and other cancers, however data is still limited in this area. No confirmed risk numbers are available for these additional cancers, though they have been reported in families that have a CHEK2 mutation.    Of note, we reviewed that the CHEK2 gene is currently considered a moderate-risk gene. This means that mutations in this gene increase the risk for certain cancers, but are unlikely to be the single cause for an individual's cancer. There are likely other genetic and/or environmental risk factors that, in combination with a CHEK2 gene mutation, cause cancer.    Cancer Screening and Prevention:  The following screening is recommended for individuals who have a mutation in the CHEK2 gene, per current National Comprehensive Cancer Network (NCCN) guidelines.  Women with CHEK2 mutations qualify for high risk breast screening.  High risk breast cancer screening recommendations include annual mammograms and annual breast MRI's, alternating every 6 months.    It is typically recommended that mammogram screening  begin at age 40, though this can be discussed in more detail with a woman's medical providers. Breast MRI with and without contrast may be considered starting at age 30-35.    Men with CHEK2 mutations can consider prostate cancer screening starting at age 40.  There are currently no other specific cancer screening guidelines for other cancers potentially associated with a CHEK2 mutation. As such, additional screening should be based on family history.    Other screening based on Kenia's personal and family history:  Other population cancer screening options, such as those recommended by the American Cancer Society and the National Comprehensive Cancer Network (NCCN), are also appropriate for Kenia and her family. These screening recommendations may change if there are changes to Kenia's personal and/or family history of cancer. Final screening recommendations should be made in consultation with each individual's primary care provider.    We discussed that Kenia could participate in our Cancer Risk Management Program in which our nursing specialist provides an individual screening plan and assists with medical management. A referral was placed to see JESSI Ortiz for this service.    Of note, the above information is based on our current understanding of Kenia's genetic findings. Kenia is encouraged to reach out to me regularly regarding any pertinent updates to her personal and/or family history of cancer, as our understanding of the genetic findings in her family may change over time.     Implications for Family Members:  We reviewed that mutations in the CHEK2 gene are inherited in an autosomal dominant pattern. This means that each of Kenia's children have a 50% chance of inheriting the same mutation. Likewise, each of her siblings has a 50% risk of having the same mutation. I am happy to help her relatives connect with a genetic counselor in their area if they would like to discuss  testing.      Additional Testing Considerations:  Even though Kenia's genetic testing result was positive, other relatives may carry the same CHEK2 mutation or a different gene mutation associated with breast and/or pancreatic cancer. Genetic counseling is recommended for her mother, brother, and children to discuss genetic testing options. If any of these relatives do pursue genetic testing, Kenia is encouraged to contact me so that we may review the impact of their test results on her.    Plan:  1.  A copy of the test results will be sent to Kenia.  2.  She plans to follow up with her other providers.  3.  A referral was placed for Kenia to meet with JESSI Ortiz to discuss screening associated with a CHEK2 mutation.    If Kenia has additional questions, I encouraged her to contact me directly via [employer].     Time spent on video: 10 minutes    Tonio Neal MS, [facility] [place]  Licensed, Certified Genetic Counselor

## 2024-10-18 NOTE — PROGRESS NOTES
"10/18/2024    Referring Provider: Estefany Natarajan MD    Presenting Information:   I spoke to Kenia by video today to discuss her genetic testing results. A saliva kit was sent to her house on 8/26/24. The Common Hereditary Cancers panel was ordered from [employer] [facility]. This testing was done because of Kenia's family history of breast and pancreatic cancer, along with the known CHEK2 mutation in the family..     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 We discussed that this mutation is associated with an increased risk for breast cancer. We discussed the impact of this testing on Kenia in detail.     Of note, Kenia is negative for any pathogenic (harmful) mutations in APC, ASHWINI, AXIN2, BAP1, BARD1, BMPR1A, BRCA1, BRCA2, BRIP1, CDH1, CDK4, CDKN2A, CTNNA1, DICER1, EPCAM, FH, GREM1, HOXB13, KIT, MBD4, MEN1, MLH1, MSH2, MSH3, MSH6, MUTYH, NF1, NTHL1, PALB2, PDGFRA, PMS2, POLD1, POLE, PTEN, RAD51C, RAD51D, SDHA, SDHB, SDHC, SDHD, SMAD4, SMARCA4, STK11, TP53, TSC1, TSC2, and VHL. No pathogenic mutations were found in any of the other 47 genes analyzed. This test involved sequencing and deletion/duplication analysis of all genes with the exceptions of EPCAM and GREM1 (deletions/duplications only) and SDHA (sequencing only).  We reviewed the autosomal dominant inheritance of these genes. Kenia cannot pass on a mutation in any of these genes to her children based on this test result. Mutations in these genes do not skip generations.      A copy of the test report can be found in the Laboratory tab, dated 9/20/24, and named \"LABORATORY MISCELLANEOUS RESULT.\"  The report is scanned in as a linked document.    Cancer Risks:    Mutations in the CHEK2 gene are associated with a moderate risk of breast cancer. Of note, studies investigating the association between specific CHEK2 variants and cancer risk have been primarily based on the variant " 1100delC, which is commonly found in individuals of  ancestry.  The lifetime breast cancer risk for women who carry one CHEK2 mutation is approximately 23-27%, with some studies suggesting the risk could be as high as 40%.  This is compared to the lifetime population risk of breast cancer of 12.8%.  These risks are likely influenced by family history, in which women with a family history of breast cancer are at higher risk than those without.   Based on new data, colorectal cancer is no longer associated with CHEK2 mutations.  We also discussed the possible association of CHEK2 mutations with increased risk for prostate, melanoma, thyroid, and other cancers, however data is still limited in this area. No confirmed risk numbers are available for these additional cancers, though they have been reported in families that have a CHEK2 mutation.    Of note, we reviewed that the CHEK2 gene is currently considered a moderate-risk gene. This means that mutations in this gene increase the risk for certain cancers, but are unlikely to be the single cause for an individual's cancer. There are likely other genetic and/or environmental risk factors that, in combination with a CHEK2 gene mutation, cause cancer.    Cancer Screening and Prevention:  The following screening is recommended for individuals who have a mutation in the CHEK2 gene, per current National Comprehensive Cancer Network (NCCN) guidelines.  Women with CHEK2 mutations qualify for high risk breast screening.  High risk breast cancer screening recommendations include annual mammograms and annual breast MRI's, alternating every 6 months.    It is typically recommended that mammogram screening begin at age 40, though this can be discussed in more detail with a woman's medical providers. Breast MRI with and without contrast may be considered starting at age 30-35.    Men with CHEK2 mutations can consider prostate cancer screening starting at age 40.  There are  currently no other specific cancer screening guidelines for other cancers potentially associated with a CHEK2 mutation. As such, additional screening should be based on family history.    Other screening based on Kenia's personal and family history:  Other population cancer screening options, such as those recommended by the American Cancer Society and the National Comprehensive Cancer Network (NCCN), are also appropriate for Kenia and her family. These screening recommendations may change if there are changes to Kenia's personal and/or family history of cancer. Final screening recommendations should be made in consultation with each individual's primary care provider.    We discussed that Kenia could participate in our Cancer Risk Management Program in which our nursing specialist provides an individual screening plan and assists with medical management. A referral was placed to see JESSI Ortiz for this service.    Of note, the above information is based on our current understanding of Kenia's genetic findings. Kenia is encouraged to reach out to me regularly regarding any pertinent updates to her personal and/or family history of cancer, as our understanding of the genetic findings in her family may change over time.     Implications for Family Members:  We reviewed that mutations in the CHEK2 gene are inherited in an autosomal dominant pattern. This means that each of Kenia's children have a 50% chance of inheriting the same mutation. Likewise, each of her siblings has a 50% risk of having the same mutation. I am happy to help her relatives connect with a genetic counselor in their area if they would like to discuss testing.    Additional Testing Considerations:  Even though Kenia's genetic testing result was positive, other relatives may carry the same CHEK2 mutation or a different gene mutation associated with breast and/or pancreatic cancer. Genetic counseling is recommended for her mother, brother, and  children to discuss genetic testing options. If any of these relatives do pursue genetic testing, Kenia is encouraged to contact me so that we may review the impact of their test results on her.    Plan:  1.  A copy of the test results will be sent to Kenia.  2.  She plans to follow up with her other providers.  3.  A referral was placed for Kenia to meet with JESSI Ortiz to discuss screening associated with a CHEK2 mutation.    If Kenia has additional questions, I encouraged her to contact me directly via [employer].     Time spent on video: 10 minutes    Tonio Neal MS, [facility] [place]  Licensed, Certified Genetic Counselor      Virtual Visit Details    Type of service:  Video Visit     Originating Location (pt. Location): Home  Distant Location (provider location):  Off-site  Platform used for Video Visit: Charis

## 2024-10-18 NOTE — PROGRESS NOTES
New Patient Oncology Nurse Navigator Note     Referring provider:   Tonio Neal GC  Cancer Risk [facility] [phone]     Referred to (specialty):  Genetic Counseling    Date Referral Received: 10/18/2024     Evaluation for: Please schedule with Susan Lee to discuss screening for CHEK2 mutation  10/18/2024:  Tonio Neal GC note is book marked

## 2024-10-18 NOTE — Clinical Note
"10/18/2024      Kenia Reyes  [address] [facility] [address]      Dear Colleague,    Thank you for referring your patient, Kenia Reyes, to the [facility]. Please see a copy of my visit note below.    Virtual Visit Details    Type of service:  Video Visit     Originating Location (pt. Location): {video visit patient location:692817::\"Home\"}  {PROVIDER LOCATION On-site should be selected for visits conducted from your clinic location or adjoining [facility] hospital, academic office, or other nearby [facility] building. Off-site should be selected for all other provider locations, including home:[id number]}  Distant Location (provider location):  {virtual location provider:035793}  Platform used for Video Visit: {Virtual Visit Platforms:188137::\""[employer]"\"}    10/18/2024    Referring Provider: Estefany Natarajan MD    Presenting Information:   I spoke to Kenia by video today to discuss her genetic testing results. A saliva kit was sent to her house on 8/26/24. The Common Hereditary Cancers panel was ordered from [employer]. This testing was done because of Kenia's family history of breast and pancreatic cancer, along with the known CHEK2 mutation in the family..     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 Specifically her mutation is called ***. We discussed that this mutation is associated with an increased risk for breast cancer. We discussed the impact of this testing on Kenia in detail.     Of note, Kenia is negative for any pathogenic (harmful) mutations in APC, ASHWINI, AXIN2, BAP1, BARD1, BMPR1A, BRCA1, BRCA2, BRIP1, CDH1, CDK4, CDKN2A, CTNNA1, DICER1, EPCAM, FH, GREM1, HOXB13, KIT, MBD4, MEN1, MLH1, MSH2, MSH3, MSH6, MUTYH, NF1, NTHL1, PALB2, PDGFRA, PMS2, POLD1, POLE, PTEN, RAD51C, RAD51D, SDHA, SDHB, SDHC, SDHD, SMAD4, SMARCA4, STK11, TP53, TSC1, TSC2, and VHL. No pathogenic mutations were found in any of the other 47 genes analyzed. This test involved " "sequencing and deletion/duplication analysis of all genes with the exceptions of EPCAM and GREM1 (deletions/duplications only) and SDHA (sequencing only).  We reviewed the autosomal dominant inheritance of these genes. Kenia cannot pass on a mutation in any of these genes to her children based on this test result. Mutations in these genes do not skip generations.      A copy of the test report can be found in the Laboratory tab, dated ***, and named \"LABORATORY MISCELLANEOUS RESULT.\"  The report is scanned in as a linked document.    Cancer Risks:    Mutations in the CHEK2 gene are associated with a moderate risk of breast cancer. Of note, studies investigating the association between specific CHEK2 variants and cancer risk have been primarily based on the variant 1100delC, which is commonly found in individuals of  ancestry.  The lifetime breast cancer risk for women who carry one CHEK2 mutation is approximately 23-27%, with some studies suggesting the risk could be as high as 40%.  This is compared to the lifetime population risk of breast cancer of 12.8%.  These risks are likely influenced by family history, in which women with a family history of breast cancer are at higher risk than those without.   Based on new data, colorectal cancer is no longer associated with CHEK2 mutations.  We also discussed the possible association of CHEK2 mutations with increased risk for prostate, melanoma, thyroid, and other cancers, however data is still limited in this area. No confirmed risk numbers are available for these additional cancers, though they have been reported in families that have a CHEK2 mutation.    Of note, we reviewed that the CHEK2 gene is currently considered a moderate-risk gene. This means that mutations in this gene increase the risk for certain cancers, but are unlikely to be the single cause for an individual's cancer. There are likely other genetic and/or environmental risk factors that, in " combination with a CHEK2 gene mutation, cause cancer.    Cancer Screening and Prevention:  The following screening is recommended for individuals who have a mutation in the CHEK2 gene, per current National Comprehensive Cancer Network (NCCN) guidelines.  Women with CHEK2 mutations qualify for high risk breast screening.  High risk breast cancer screening recommendations include annual mammograms and annual breast MRI's, alternating every 6 months.    It is typically recommended that mammogram screening begin at age 40, though this can be discussed in more detail with a woman's medical providers. Breast MRI with and without contrast may be considered starting at age 30-35.    Men with CHEK2 mutations can consider prostate cancer screening starting at age 40.  There are currently no other specific cancer screening guidelines for other cancers potentially associated with a CHEK2 mutation. As such, additional screening should be based on family history.    Other screening based on Kenia's personal and family history:  Other population cancer screening options, such as those recommended by the American Cancer Society and the National Comprehensive Cancer Network (NCCN), are also appropriate for Kenia and her family. These screening recommendations may change if there are changes to Kenia's personal and/or family history of cancer. Final screening recommendations should be made in consultation with each individual's primary care provider.    We discussed that Kenia could participate in our Cancer Risk Management Program in which our nursing specialist provides an individual screening plan and assists with medical management. A referral was placed to see JESSI Ortiz for this service.    Of note, the above information is based on our current understanding of Kenia's genetic findings. Kenia is encouraged to reach out to me regularly regarding any pertinent updates to her personal and/or family history of cancer, as  "our understanding of the genetic findings in her family may change over time.     Implications for Family Members:  We reviewed that mutations in the *** gene are inherited in an autosomal dominant pattern. This means that each of Kenia's children have a 50% chance of inheriting the same mutation. Likewise, each of her siblings has a 50% risk of having the same mutation. ***Extended relatives may also carry this mutation, and she is encouraged to share this information with her family members on both sides of the family. I am happy to help her relatives connect with a genetic counselor in their area if they would like to discuss testing.    ***Reproductive risk    Additional Testing Considerations:  ***It is possible Kenia does carry another gene mutation or combination of genes and environment that increase her risk for *** cancer. We again reviewed the option of larger gene panels covering more moderate risk genes associated with *** cancer. Kenia is encouraged to contact me if she wishes to readdress these larger gene panel options.    ***Even though Kenia's genetic testing result was positive, other relatives may carry the same *** mutation or a different gene mutation associated with *** cancer. Genetic counseling is recommended for *** to discuss genetic testing options. *** If any of these relatives do pursue genetic testing, Kenia is encouraged to contact me so that we may review the impact of their test results on her.    Support Resources:  I provided Kenia with information regarding national and local support resources including ***.    Plan:  1.  A copy of the test results will be sent to Kenia.  2.  I will provide a \"Dear Relative\" letter for Kenia to share with her family members.  3.  She plans to follow up with ***.  4.  *** A referral was placed for Kenia to meet with JESSI Ortiz to discuss screening associated with a *** mutation.    If Kenia has additional questions, I encouraged her to " contact me directly at ***.     Time spent on video: *** minutes    Tonio Neal MS, [facility] [place]  Licensed, Certified Genetic Counselor    ***    Virtual Visit Details    Type of service:  Video Visit     Originating Location (pt. Location): Home  Distant Location (provider location):  Off-site  Platform used for Video Visit: Charis      Again, thank you for allowing me to participate in the care of your patient.        Sincerely,        Tonio Neal, GC

## 2024-10-25 NOTE — PROGRESS NOTES
"Oncology Risk Management Consultation:  Date on this visit: 10/30/2024    Kenia Reyes is referred by Tonio Neal [facility] [place] for an oncology risk management consultation. 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 She is considered to be at high risk for hereditary breast cancer.    Primary Physician: Estefany Natarajan MD    History Of Present Illness:  Ms. Reyes is a 53 year old female who presents with family history of breast and pancreatic cancer and a personal diagnosis of a CHEK2 mutation.     Genetic Testing: POSITIVE  9/20/2024: Kenia is POSITIVE for a CHEK2 mutation. Specifically her mutation is called [id number] ([id number]).      Of note, Kenia is negative for any pathogenic (harmful) mutations in APC, ASHWINI, AXIN2, BAP1, BARD1, BMPR1A, BRCA1, BRCA2, BRIP1, CDH1, CDK4, CDKN2A, CTNNA1, DICER1, EPCAM, FH, GREM1, HOXB13, KIT, MBD4, MEN1, MLH1, MSH2, MSH3, MSH6, MUTYH, NF1, NTHL1, PALB2, PDGFRA, PMS2, POLD1, POLE, PTEN, RAD51C, RAD51D, SDHA, SDHB, SDHC, SDHD, SMAD4, SMARCA4, STK11, TP53, TSC1, TSC2, and VHL. No pathogenic mutations were found in any of the other 47 genes analyzed. This test involved sequencing and deletion/duplication analysis of all genes with the exceptions of EPCAM and GREM1 (deletions/duplications only) and SDHA (sequencing only).  .       A copy of the test report can be found in the Laboratory tab, dated 9/20/24, and named \"LABORATORY MISCELLANEOUS RESULT.\"  The report is scanned in as a linked document.    Pertinent history:  Menarche: 13  First child at age 33, has two children  Breast fed for approximately one year  Menopause status: Premenopausal  Currently using Mirena IUD  Breast Density: heterogeneously dense   Ovaries, fallopian tubes, and uterus intact  No history of HRT use  Took OCs for approximately 6 years total  Breast Density: heterogeneously dense  Hx of breast biopsy: " none  Alcohol: Occasionally on the weekends  Smoking: No use  Other health habits: Exercises 6-7 days per week    Pertinent screening history:  10/21/2015: Screening mammogram, BiRads1  2017: Screening mammogram, BiRads0 for focal asymmetry in the left breast  2017: Left breast diagnostic mammogram and US, BiRads1  2019: Screening tomosynthesis mammogram, BiRads1  10/15/2020: Screening tomosynthesis mammogram, BiRads1  2023: Screening tomosynthesis mammogram, BiRads1  2024: Screening tomosynthesis mammogram, BiRads1    At this visit, she denies new fatigue, breast pain, asymmetry, lumps, masses, thickening, nipple discharge and skin changes in her breasts.      Past Medical/Surgical History:  No past medical history on file.  Past Surgical History:   Procedure Laterality Date    ZZC NONSPECIFIC PROCEDURE       nsvdx1       Allergies:  Allergies as of 10/30/2024    (No Known Allergies)       Current Medications:  Current Outpatient Medications   Medication Sig Dispense Refill    Levonorgestrel (MIRENA IU) by Intrauterine route.      metroNIDAZOLE (METROGEL) 0.75 % external gel APPLY TOPICALLY TO ENTIRE FACE EVERY DAY      Multiple Vitamins-Minerals (MULTIVITAMIN ADULT PO) Take by mouth daily          Family History:  Family History   Problem Relation Age of Onset    Lymphoma Father 45        Non-Hodgkins    Unknown/Adopted Father     Breast Cancer Maternal Aunt 50    Pancreatic Cancer Maternal Aunt 62    Breast Cancer Paternal Half-Sister 37        CHEK2+       Social History:  Social History     Socioeconomic History    Marital status:      Spouse name: Not on file    Number of children: Not on file    Years of education: Not on file    Highest education level: Not on file   Occupational History    Not on file   Tobacco Use    Smoking status: Never    Smokeless tobacco: Never   Substance and Sexual Activity    Alcohol use: Yes     Alcohol/week: 3.0 standard drinks of alcohol      "Types: 3 Standard drinks or equivalent per week    Drug use: No    Sexual activity: Yes     Partners: Male     Birth control/protection: I.U.D.   Other Topics Concern    Parent/sibling w/ CABG, MI or angioplasty before 65F 55M? Not Asked   Social History Narrative    Not on file     Social Drivers of Health     Financial Resource Strain: Not on file   Food Insecurity: Not on file   Transportation Needs: Not on file   Physical Activity: Not on file   Stress: Not on file   Social Connections: Not on file   Interpersonal Safety: Not on file   Housing Stability: Not on file       Physical Exam:  /72 (BP Location: Right arm, Patient Position: Sitting, Cuff Size: Adult Regular)   Pulse 56   Temp 98.9  F (37.2  C) (Oral)   Resp 12   Ht 1.554 m (5' 1.18\")   Wt 62.8 kg (138 lb 8 oz)   SpO2 100%   BMI 26.01 kg/m    GENERAL: alert and no distress  EYES: Eyes grossly normal to inspection.  No discharge or erythema, or obvious scleral/conjunctival abnormalities.  RESP: No audible wheeze, cough, or visible cyanosis.    SKIN: Visible skin clear. No significant rash, abnormal pigmentation or lesions.  NEURO: Cranial nerves grossly intact.  Mentation and speech appropriate for age.  PSYCH: Appropriate affect, tone, and pace of words    Laboratory/Imaging Studies  No results found for any visits on 10/30/24.    ASSESSMENT    Kenia comes to the Cancer Risk Management Program to discuss screening recommendations related to a CHEK2 genetic  mutation. We discussed the differences between cancer risk for the general population and those with CHEK2 genetic mutation. Current literature shows that women with CHEK2 mutations have a 23-27% estimated lifetime risk for developing breast cancer, compared to the general population risk of 12-13%. Some literature also suggests that those with a CHEK2 mutation are also at risk for kidney and thyroid cancer, however, there are no screening recommendations at this time. Mutations in the " CHEK2 gene were previously thought to increase the risk of colon cancer, however, this is no longer the case and thus, the recommendations for increased screening for colon cancer have been revised. There is emerging evidence for an increased risk for prostate cancer; prostate screening should be considered at age 40. We also discussed that inheriting a mutation does not mean that a person will develop cancer, but rather that they are at increased risk.       We discussed the plan below and the recommendation for breast screening alternating an annual mammogram with an annual breast MRI. We will start with a breast MRI soon, and I will see her back in clinic in six months for a breast exam prior to her next mammogram.     We reviewed signs and symptoms to monitor for between visits, including any breast lumps, bumps, nipple discharge, nipple inversion, changes to the texture of the skin on the breasts that would look crinkled, puckered, or like the skin of an orange peel, or any other changes to the breast tissue. We reviewed the recommendation for breast self awareness. Kenia gonzalezly declined a breast exam at this visit.     We also discussed following a healthy lifestyle plan recommended by both NCCN and the American Cancer Society that can reduce the risk of cancer:  1. Limit alcohol consumption to less than 1 drink per day and no more than three drinks per week  (1 drink=5 oz.wine, 12 oz. Beer or 1.5 oz. 80-proof liquor).  2. Exercise per American Cancer Society guidelines of at least 150 minutes of moderate-intensity activity or 75 minutes of vigorous activity each week. (Or a combination of both.) Exercise should be spread  out over the week.  3. Maintain a healthy weight with a Body Mass Index between 19-24.9.  4. Do not use tobacco products and limit exposure to passive smoke.  5. Eat a diet with a variety of fruits, vegetables, and whole grains. Limit intake of processed meats, such as warren, salami, deli  meats and anything that is smoked or cured.         INDIVIDUALIZED CANCER RISK MANAGEMENT PLAN:      Individualized Surveillance Plan for people with   With CHEK2 genetic mutations   Per NCCN Guidelines for Genetic/Familial High-risk Assessment:   Breast and Ovarian Version 1.2025 and Colorectal Guidelines 4.2024   Recommended screening Test or procedure Last done Next Scheduled    Breast screening -   Stating at age 18. Breast self-awareness, so that women are familiar with normal breast changes and report unusual changes to their health care provider.   October 2024   May 2025   Breast Cancer Risk: 23-27%     Clinical breast exams every 6 -12 months    Consider annual breast MRI, at age 30-35    Annual mammogram beginning at age 40    Evidence insufficient for RRM,   manage based on family history     2/22/2024: Screening tomosynthesis mammogram, BiRads1   Breast MRI soon    Return to clinic in May, 2025 with a mammogram following our visit   Colon cancer screening, no longer an increased risk General population screening is appropriate for individuals with CHEK2    Consider increased screening, per current NCCN guidelines for probands with a first degree family member with CRC   No family history of colon cancer   NA   Prostate Cancer Screening  Emerging evidence for association with increased risk.  Consider prostate cancer screening starting at age 40 years   NA   NA    Women with CHEK2 genetic mutations are considered to have a higher risk for breast cancer with frameshift mutations. The risks for missense mutations is unclear.   For some missense mutations such as [id number] and [id number], the risk for breast cancer appears to be lower. Base additional cancer risk management on family history.            I spent a total of 30 minutes on the day of the visit. Please see the note for further information on patient assessment and treatment.     Susan Lee, DNP, APRN, AGCNS-BC  Clinical Nurse  Specialist  Cancer Risk Management Program  [facility] [place]    Cc:  OUSMANE Holland MD

## 2024-10-25 NOTE — TELEPHONE ENCOUNTER
RECORDS STATUS - RISK MANAGEMENT      RECORDS RECEIVED FROM: [facility]   NOTES STATUS DETAILS   OFFICE NOTE from referring provider Epic 10/18/2024 - Tonio Neal GC   OFFICE NOTE from genetic counselor Epic 8/26/2024 - Tonio Neal [facility]   MEDICATION LIST [facility]    LABS     PATHOLOGY REPORTS ONLY     ANYTHING RELATED TO DIAGNOSIS Epic 2/27/2024   GENONOMIC TESTING     TYPE: [facility] Epic 9/20/2024 - [facility] Common Hereditary Cancers Panel    IMAGING (NEED IMAGES & REPORT)     MAMMOS PACS 02/22/24-09/09/11    ULTRASOUND PACS 08/07/17: US Breast

## 2024-10-30 ENCOUNTER — PRE VISIT (OUTPATIENT)
Dept: ONCOLOGY | Facility: CLINIC | Age: 54
End: 2024-10-30
Payer: COMMERCIAL

## 2024-10-30 ENCOUNTER — ONCOLOGY VISIT (OUTPATIENT)
Dept: ONCOLOGY | Facility: CLINIC | Age: 54
End: 2024-10-30
Attending: GENETIC COUNSELOR, MS
Payer: COMMERCIAL

## 2024-10-30 VITALS
OXYGEN SATURATION: 100 % | WEIGHT: 138.5 LBS | BODY MASS INDEX: 26.15 KG/M2 | RESPIRATION RATE: 12 BRPM | HEART RATE: 56 BPM | SYSTOLIC BLOOD PRESSURE: 120 MMHG | DIASTOLIC BLOOD PRESSURE: 72 MMHG | TEMPERATURE: 98.9 F | HEIGHT: 61 IN

## 2024-10-30 DIAGNOSIS — Z12.39 BREAST CANCER SCREENING, HIGH RISK PATIENT: ICD-10-CM

## 2024-10-30 DIAGNOSIS — Z15.09 MONOALLELIC MUTATION OF CHEK2 GENE: Primary | ICD-10-CM

## 2024-10-30 PROCEDURE — 99214 OFFICE O/P EST MOD 30 MIN: CPT

## 2024-10-30 PROCEDURE — 99213 OFFICE O/P EST LOW 20 MIN: CPT

## 2024-10-30 RX ORDER — METRONIDAZOLE 7.5 MG/G
GEL TOPICAL
COMMUNITY
Start: 2023-12-19

## 2024-10-30 ASSESSMENT — PAIN SCALES - GENERAL: PAINLEVEL_OUTOF10: NO PAIN (0)

## 2024-10-30 NOTE — LETTER
"10/30/2024      Kenia Reyes  [address] [facility] [address]      Dear Colleague,    Thank you for referring your patient, Kenia Reyes, to the [facility]. Please see a copy of my visit note below.    Oncology Risk Management Consultation:  Date on this visit: 10/30/2024    Kenia Reyes is referred by Tonio Neal [facility] for an oncology risk management consultation. 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 She is considered to be at high risk for hereditary breast cancer.    Primary Physician: Estefany Natarajan MD    History Of Present Illness:  Ms. Reyes is a 53 year old female who presents with family history of breast and pancreatic cancer and a personal diagnosis of a CHEK2 mutation.     Genetic Testing: POSITIVE  9/20/2024: Kenia is POSITIVE for a CHEK2 mutation. Specifically her mutation is called c.485A>G (p.[id number]).      Of note, Kenia is negative for any pathogenic (harmful) mutations in APC, ASHWINI, AXIN2, BAP1, BARD1, BMPR1A, BRCA1, BRCA2, BRIP1, CDH1, CDK4, CDKN2A, CTNNA1, DICER1, EPCAM, FH, GREM1, HOXB13, KIT, MBD4, MEN1, MLH1, MSH2, MSH3, MSH6, MUTYH, NF1, NTHL1, PALB2, PDGFRA, PMS2, POLD1, POLE, PTEN, RAD51C, RAD51D, SDHA, SDHB, SDHC, SDHD, SMAD4, SMARCA4, STK11, TP53, TSC1, TSC2, and VHL. No pathogenic mutations were found in any of the other 47 genes analyzed. This test involved sequencing and deletion/duplication analysis of all genes with the exceptions of EPCAM and GREM1 (deletions/duplications only) and SDHA (sequencing only).  .       A copy of the test report can be found in the Laboratory tab, dated 9/20/24, and named \"LABORATORY MISCELLANEOUS RESULT.\"  The report is scanned in as a linked document.    Pertinent history:  Menarche: 13  First child at age 33, has two children  Breast fed for approximately one year  Menopause status: " Premenopausal  Currently using Mirena IUD  Breast Density: heterogeneously dense   Ovaries, fallopian tubes, and uterus intact  No history of HRT use  Took OCs for approximately 6 years total  Breast Density: heterogeneously dense  Hx of breast biopsy: none  Alcohol: Occasionally on the weekends  Smoking: No use  Other health habits: Exercises 6-7 days per week    Pertinent screening history:  10/21/2015: Screening mammogram, BiRads1  2017: Screening mammogram, BiRads0 for focal asymmetry in the left breast  2017: Left breast diagnostic mammogram and US, BiRads1  2019: Screening tomosynthesis mammogram, BiRads1  10/15/2020: Screening tomosynthesis mammogram, BiRads1  2023: Screening tomosynthesis mammogram, BiRads1  2024: Screening tomosynthesis mammogram, BiRads1    At this visit, she denies new fatigue, breast pain, asymmetry, lumps, masses, thickening, nipple discharge and skin changes in her breasts.      Past Medical/Surgical History:  No past medical history on file.  Past Surgical History:   Procedure Laterality Date     Z NONSPECIFIC PROCEDURE       nsvdx1       Allergies:  Allergies as of 10/30/2024     (No Known Allergies)       Current Medications:  Current Outpatient Medications   Medication Sig Dispense Refill     Levonorgestrel (MIRENA IU) by Intrauterine route.       metroNIDAZOLE (METROGEL) 0.75 % external gel APPLY TOPICALLY TO ENTIRE FACE EVERY DAY       Multiple Vitamins-Minerals (MULTIVITAMIN ADULT PO) Take by mouth daily          Family History:  Family History   Problem Relation Age of Onset     Lymphoma Father 45        Non-Hodgkins     Unknown/Adopted Father      Breast Cancer Maternal Aunt 50     Pancreatic Cancer Maternal Aunt 62     Breast Cancer Paternal Half-Sister 37        CHEK2+       Social History:  Social History     Socioeconomic History     Marital status:      Spouse name: Not on file     Number of children: Not on file     Years of education:  "Not on file     Highest education level: Not on file   Occupational History     Not on file   Tobacco Use     Smoking status: Never     Smokeless tobacco: Never   Substance and Sexual Activity     Alcohol use: Yes     Alcohol/week: 3.0 standard drinks of alcohol     Types: 3 Standard drinks or equivalent per week     Drug use: No     Sexual activity: Yes     Partners: Male     Birth control/protection: I.U.D.   Other Topics Concern     Parent/sibling w/ CABG, MI or angioplasty before 65F 55M? Not Asked   Social History Narrative     Not on file     Social Drivers of Health     Financial Resource Strain: Not on file   Food Insecurity: Not on file   Transportation Needs: Not on file   Physical Activity: Not on file   Stress: Not on file   Social Connections: Not on file   Interpersonal Safety: Not on file   Housing Stability: Not on file       Physical Exam:  /72 (BP Location: Right arm, Patient Position: Sitting, Cuff Size: Adult Regular)   Pulse 56   Temp 98.9  F (37.2  C) (Oral)   Resp 12   Ht 1.554 m (5' 1.18\")   Wt 62.8 kg (138 lb 8 oz)   SpO2 100%   BMI 26.01 kg/m    GENERAL: alert and no distress  EYES: Eyes grossly normal to inspection.  No discharge or erythema, or obvious scleral/conjunctival abnormalities.  RESP: No audible wheeze, cough, or visible cyanosis.    SKIN: Visible skin clear. No significant rash, abnormal pigmentation or lesions.  NEURO: Cranial nerves grossly intact.  Mentation and speech appropriate for age.  PSYCH: Appropriate affect, tone, and pace of words    Laboratory/Imaging Studies  No results found for any visits on 10/30/24.    ASSESSMENT    Kenia comes to the Cancer Risk Management Program to discuss screening recommendations related to a CHEK2 genetic  mutation. We discussed the differences between cancer risk for the general population and those with CHEK2 genetic mutation. Current literature shows that women with CHEK2 mutations have a 23-27% estimated lifetime risk " for developing breast cancer, compared to the general population risk of 12-13%. Some literature also suggests that those with a CHEK2 mutation are also at risk for kidney and thyroid cancer, however, there are no screening recommendations at this time. Mutations in the CHEK2 gene were previously thought to increase the risk of colon cancer, however, this is no longer the case and thus, the recommendations for increased screening for colon cancer have been revised. There is emerging evidence for an increased risk for prostate cancer; prostate screening should be considered at age 40. We also discussed that inheriting a mutation does not mean that a person will develop cancer, but rather that they are at increased risk.       We discussed the plan below and the recommendation for breast screening alternating an annual mammogram with an annual breast MRI. We will start with a breast MRI soon, and I will see her back in clinic in six months for a breast exam prior to her next mammogram.     We reviewed signs and symptoms to monitor for between visits, including any breast lumps, bumps, nipple discharge, nipple inversion, changes to the texture of the skin on the breasts that would look crinkled, puckered, or like the skin of an orange peel, or any other changes to the breast tissue. We reviewed the recommendation for breast self awareness. Kenia henson declined a breast exam at this visit.     We also discussed following a healthy lifestyle plan recommended by both NCCN and the American Cancer Society that can reduce the risk of cancer:  1. Limit alcohol consumption to less than 1 drink per day and no more than three drinks per week  (1 drink=5 oz.wine, 12 oz. Beer or 1.5 oz. 80-proof liquor).  2. Exercise per American Cancer Society guidelines of at least 150 minutes of moderate-intensity activity or 75 minutes of vigorous activity each week. (Or a combination of both.) Exercise should be spread  out over the  week.  3. Maintain a healthy weight with a Body Mass Index between 19-24.9.  4. Do not use tobacco products and limit exposure to passive smoke.  5. Eat a diet with a variety of fruits, vegetables, and whole grains. Limit intake of processed meats, such as warren, salami, deli meats and anything that is smoked or cured.         INDIVIDUALIZED CANCER RISK MANAGEMENT PLAN:      Individualized Surveillance Plan for people with   With CHEK2 genetic mutations   Per NCCN Guidelines for Genetic/Familial High-risk Assessment:   Breast and Ovarian Version 1.2025 and Colorectal Guidelines 4.2024   Recommended screening Test or procedure Last done Next Scheduled    Breast screening -   Stating at age 18. Breast self-awareness, so that women are familiar with normal breast changes and report unusual changes to their health care provider.   October 2024   May 2025   Breast Cancer Risk: 23-27%     Clinical breast exams every 6 -12 months    Consider annual breast MRI, at age 30-35    Annual mammogram beginning at age 40    Evidence insufficient for RRM,   manage based on family history     2/22/2024: Screening tomosynthesis mammogram, BiRads1   Breast MRI soon    Return to clinic in May, 2025 with a mammogram following our visit   Colon cancer screening, no longer an increased risk General population screening is appropriate for individuals with CHEK2    Consider increased screening, per current NCCN guidelines for probands with a first degree family member with CRC   No family history of colon cancer   NA   Prostate Cancer Screening  Emerging evidence for association with increased risk.  Consider prostate cancer screening starting at age 40 years   NA   NA    Women with CHEK2 genetic mutations are considered to have a higher risk for breast cancer with frameshift mutations. The risks for missense mutations is unclear.   For some missense mutations such as [id number] and [id number], the risk for breast cancer appears to be lower.  Base additional cancer risk management on family history.            I spent a total of 30 minutes on the day of the visit. Please see the note for further information on patient assessment and treatment.     Susan Lee DNP, CURT, [facility]-BC  Clinical Nurse Specialist  Cancer Risk Management Program  [facility]    Cc:  OUSMANE Holland MD                Again, thank you for allowing me to participate in the care of your patient.        Sincerely,        CURT Ortiz CNP

## 2024-10-30 NOTE — NURSING NOTE
"Oncology Rooming Note    October 30, 2024 11:53 AM   Kenia Reyes is a 53 year old female who presents for:    Chief Complaint   Patient presents with    Oncology Clinic Visit     Monoallelic mutation of CHEK2 gene     Initial Vitals: /72 (BP Location: Right arm, Patient Position: Sitting, Cuff Size: Adult Regular)   Pulse 56   Temp 98.9  F (37.2  C) (Oral)   Resp 12   Ht 1.554 m (5' 1.18\")   Wt 62.8 kg (138 lb 8 oz)   SpO2 100%   BMI 26.01 kg/m   Estimated body mass index is 26.01 kg/m  as calculated from the following:    Height as of this encounter: 1.554 m (5' 1.18\").    Weight as of this encounter: 62.8 kg (138 lb 8 oz). Body surface area is 1.65 meters squared.  No Pain (0) Comment: Data Unavailable   No LMP recorded. (Menstrual status: IUD).  Allergies reviewed: Yes  Medications reviewed: Yes    Medications: Medication refills not needed today.  Pharmacy name entered into [employer]:    [id number] [place] [facility] [address] [facility] [id number] [id number] [address] AT [address] [place] [address]    Frailty Screening:   Is the patient here for a new oncology consult visit in cancer care? 1. Yes. Over the past month, have you experienced difficulty or required a caregiver to assist with:   1. Balance, walking or general mobility (including any falls)? NO  2. Completion of self-care tasks such as bathing, dressing, toileting, grooming/hygiene?  NO  3. Concentration or memory that affects your daily life?  NO       Clinical concerns: none    Ni Noble"

## 2024-10-30 NOTE — PATIENT INSTRUCTIONS
Individualized Surveillance Plan for people with   With CHEK2 genetic mutations   Per NCCN Guidelines for Genetic/Familial High-risk Assessment:   Breast and Ovarian Version 1.2025 and Colorectal Guidelines 4.2024   Recommended screening Test or procedure Last done Next Scheduled    Breast screening -   Stating at age 18. Breast self-awareness, so that women are familiar with normal breast changes and report unusual changes to their health care provider.   October 2024   May 2025   Breast Cancer Risk: 23-27%     Clinical breast exams every 6 -12 months    Consider annual breast MRI, at age 30-35    Annual mammogram beginning at age 40    Evidence insufficient for RRM,   manage based on family history     2/22/2024: Screening tomosynthesis mammogram, BiRads1   Breast MRI soon    Return to clinic in May, 2025 with a mammogram following our visit   Colon cancer screening, no longer an increased risk General population screening is appropriate for individuals with CHEK2    Consider increased screening, per current NCCN guidelines for probands with a first degree family member with CRC   No family history of colon cancer   NA   Prostate Cancer Screening  Emerging evidence for association with increased risk.  Consider prostate cancer screening starting at age 40 years   NA   NA    Women with CHEK2 genetic mutations are considered to have a higher risk for breast cancer with frameshift mutations. The risks for missense mutations is unclear.   For some missense mutations such as [id number] and [id number], the risk for breast cancer appears to be lower. Base additional cancer risk management on family history.

## 2024-11-25 ENCOUNTER — HOSPITAL ENCOUNTER (OUTPATIENT)
Dept: MRI IMAGING | Facility: CLINIC | Age: 54
Discharge: HOME | End: 2024-11-25
Payer: COMMERCIAL

## 2024-11-25 DIAGNOSIS — Z15.09 MONOALLELIC MUTATION OF CHEK2 GENE: ICD-10-CM

## 2024-11-25 DIAGNOSIS — Z12.39 BREAST CANCER SCREENING, HIGH RISK PATIENT: ICD-10-CM

## 2024-11-25 PROCEDURE — 77049 MRI BREAST C-+ W/CAD BI: CPT

## 2024-11-25 PROCEDURE — A9585 GADOBUTROL INJECTION: HCPCS

## 2024-11-25 PROCEDURE — 255N000002 HC RX 255 OP 636

## 2024-11-25 RX ORDER — GADOBUTROL 604.72 MG/ML
6 INJECTION INTRAVENOUS ONCE
Status: COMPLETED | OUTPATIENT
Start: 2024-11-25 | End: 2024-11-25

## 2024-11-25 RX ADMIN — GADOBUTROL 6 ML: 604.72 INJECTION INTRAVENOUS at 09:33

## 2025-05-09 NOTE — PROGRESS NOTES
"Oncology Risk Management Consultation:  Date on this visit: 5/14/2025    Kenia Reyes returns to the Cancer Risk Management Program for an oncology risk management consultation. 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 She is considered to be at high risk for hereditary breast cancer.     Primary Physician: Estefany Natarajan MD     History Of Present Illness:  Ms. Reyes is a 54 year old female who presents with family history of breast and pancreatic cancer and a personal diagnosis of a CHEK2 mutation.      Genetic Testing: POSITIVE  9/20/2024: Kenia is POSITIVE for a CHEK2 mutation. Specifically her mutation is called [id number] ([id number]).      Of note, Kenia is negative for any pathogenic (harmful) mutations in APC, ASHWINI, AXIN2, BAP1, BARD1, BMPR1A, BRCA1, BRCA2, BRIP1, CDH1, CDK4, CDKN2A, CTNNA1, DICER1, EPCAM, FH, GREM1, HOXB13, KIT, MBD4, MEN1, MLH1, MSH2, MSH3, MSH6, MUTYH, NF1, NTHL1, PALB2, PDGFRA, PMS2, POLD1, POLE, PTEN, RAD51C, RAD51D, SDHA, SDHB, SDHC, SDHD, SMAD4, SMARCA4, STK11, TP53, TSC1, TSC2, and VHL. No pathogenic mutations were found in any of the other 47 genes analyzed. This test involved sequencing and deletion/duplication analysis of all genes with the exceptions of EPCAM and GREM1 (deletions/duplications only) and SDHA (sequencing only).  .       A copy of the test report can be found in the Laboratory tab, dated 9/20/24, and named \"LABORATORY MISCELLANEOUS RESULT.\"  The report is scanned in as a linked document.     Pertinent history:  Menarche: 13  First child at age 33, has two children  Breast fed for approximately one year  Menopause status: Premenopausal  Currently using Mirena IUD  Breast Density: heterogeneously dense   Ovaries, fallopian tubes, and uterus intact  No history of HRT use  Took OCs for approximately 6 years total  Breast Density: heterogeneously dense  Hx of " breast biopsy: none  Alcohol: Occasionally on the weekends  Smoking: No use  Other health habits: Exercises 6-7 days per week     Pertinent screening history:  10/21/2015: Screening mammogram, BiRads1  2017: Screening mammogram, BiRads0 for focal asymmetry in the left breast  2017: Left breast diagnostic mammogram and US, BiRads1  2019: Screening tomosynthesis mammogram, BiRads1  10/15/2020: Screening tomosynthesis mammogram, BiRads1  2023: Screening tomosynthesis mammogram, BiRads1  2024: Screening tomosynthesis mammogram, BiRads1  2024: Breast MRI, BiRads1       At this visit, she denies new fatigue, breast pain, asymmetry, lumps, masses, thickening, nipple discharge and skin changes in her breasts.    Past Medical/Surgical History:  No past medical history on file.  Past Surgical History:   Procedure Laterality Date    ZZC NONSPECIFIC PROCEDURE       nsvdx1       Allergies:  Allergies as of 2025    (No Known Allergies)       Current Medications:  Current Outpatient Medications   Medication Sig Dispense Refill    Levonorgestrel (MIRENA IU) by Intrauterine route.      metroNIDAZOLE (METROGEL) 0.75 % external gel APPLY TOPICALLY TO ENTIRE FACE EVERY DAY      Multiple Vitamins-Minerals (MULTIVITAMIN ADULT PO) Take by mouth daily          Family History:  Family History   Problem Relation Age of Onset    Lymphoma Father 45        Non-Hodgkins    Unknown/Adopted Father     Breast Cancer Maternal Aunt 50    Pancreatic Cancer Maternal Aunt 62    Breast Cancer Paternal Half-Sister 37        CHEK2+       Social History:  Social History     Socioeconomic History    Marital status:      Spouse name: Not on file    Number of children: Not on file    Years of education: Not on file    Highest education level: Not on file   Occupational History    Not on file   Tobacco Use    Smoking status: Never    Smokeless tobacco: Never   Substance and Sexual Activity    Alcohol use: Yes      "Alcohol/week: 3.0 standard drinks of alcohol     Types: 3 Standard drinks or equivalent per week    Drug use: No    Sexual activity: Yes     Partners: Male     Birth control/protection: I.U.D.   Other Topics Concern    Parent/sibling w/ CABG, MI or angioplasty before 65F 55M? Not Asked   Social History Narrative    Not on file     Social Drivers of Health     Financial Resource Strain: Not on file   Food Insecurity: Not on file   Transportation Needs: Not on file   Physical Activity: Not on file   Stress: Not on file   Social Connections: Not on file   Interpersonal Safety: Not on file   Housing Stability: Not on file       Physical Exam:  /66 (BP Location: Right arm, Patient Position: Sitting, Cuff Size: Adult Regular)   Pulse 54   Resp 20   Ht 1.565 m (5' 1.61\")   Wt 62 kg (136 lb 11.2 oz)   SpO2 98%   BMI 25.32 kg/m    GENERAL APPEARANCE: healthy, alert and no apparent distress  BREAST: A multipositional, bilateral breast exam was performed.  Fairly symmetrical. Nipples everted bilaterally. Right breast: no palpable dominant masses, no nipple discharge, no skin changes. Dense tissue.  Right axilla: no palpable adenopathy. Left breast: no palpable dominant masses, no nipple discharge, no skin changes. Left axilla: no palpable adenopathy. Dense tissue.    LYMPHATICS: No cervical, supraclavicular, or axillary lymphadenopathy  SKIN: no suspicious lesions or rashes on examined skin    Laboratory/Imaging Studies  No results found for any visits on 05/14/25.    ASSESSMENT    Kenia reports that she is doing well at this visit. She has no concerns with her breast tissue, and no updates to her family's medical history. We discussed the plan below, and will proceed with a breast MRI in November, and for her to return to see me in May, 2026 with a mammogram following our visit.     We reviewed signs and symptoms to monitor for between visits, including any breast lumps, bumps, nipple discharge, nipple inversion, " changes to the texture of the skin on the breasts that would look crinkled, puckered, or like the skin of an orange peel, or any other changes to the breast tissue. We reviewed the recommendation for breast self awareness.           Individualized Surveillance Plan for people with   With CHEK2 genetic mutations   Per NCCN Guidelines for Genetic/Familial High-risk Assessment:   Breast and Ovarian Version 3.2025 and Colorectal Guidelines 4.2024   Recommended screening Test or procedure Last done Next Scheduled    Breast screening -   Stating at age 18. Breast self-awareness, so that women are familiar with normal breast changes and report unusual changes to their health care provider.   May 2025   May 2026   Breast Cancer Risk: 23-27%*    *Risk data are based only on frameshift P/LP data     Clinical breast exams every 6 -12 months    Consider annual breast MRI starting at age 30-35    Annual mammogram beginning at age 40    Evidence insufficient for RRM,   manage based on family history     2/22/2024: Screening tomosynthesis mammogram, BiRads1    11/25/2024: Breast MRI, BiRads1   Mammogram today    Breast MRI in November    Return to clinic in May, 2026 with a mammogram following our visit   Prostate Cancer Screening Emerging evidence for association with increased risk.  Consider prostate cancer screening starting at age 40 years   NA   NA   CHEK2 was previously thought to increase the risk of colon cancer. This is no longer the case, and such, no increased colon cancer screening is recommended on the basis of a CHEK2 mutation alone.  Women with CHEK2 genetic mutations are considered to have a higher risk for breast cancer with frameshift mutations. The risks for missense mutations is unclear.   There is emerging evidence that not all missense P/LP variants are low penetrance. For some P/LP variants, such as [id number] and [id number], the risk for breast cancer appears to be lower. Additional cancer risk management  based on these variants is not recommended. Management should be based on best estimates of cancer risk for the specific P/LP variant and family history.        I spent a total of 16 minutes on the day of the visit. Please see the note for further information on patient assessment and treatment.     Susan Lee, RENETTA, APRN, [facility]  Clinical Nurse Specialist  Cancer Risk Management Program  [facility]    Cc:  Estefany Natarajan MD

## 2025-05-14 ENCOUNTER — ANCILLARY PROCEDURE (OUTPATIENT)
Dept: MAMMOGRAPHY | Facility: CLINIC | Age: 55
End: 2025-05-14
Payer: COMMERCIAL

## 2025-05-14 ENCOUNTER — ONCOLOGY VISIT (OUTPATIENT)
Dept: ONCOLOGY | Facility: CLINIC | Age: 55
End: 2025-05-14
Payer: COMMERCIAL

## 2025-05-14 VITALS
DIASTOLIC BLOOD PRESSURE: 66 MMHG | BODY MASS INDEX: 25.16 KG/M2 | WEIGHT: 136.7 LBS | RESPIRATION RATE: 20 BRPM | HEIGHT: 62 IN | SYSTOLIC BLOOD PRESSURE: 111 MMHG | OXYGEN SATURATION: 98 % | HEART RATE: 54 BPM

## 2025-05-14 DIAGNOSIS — Z15.09 MONOALLELIC MUTATION OF CHEK2 GENE: Primary | ICD-10-CM

## 2025-05-14 DIAGNOSIS — Z15.09 MONOALLELIC MUTATION OF CHEK2 GENE: ICD-10-CM

## 2025-05-14 DIAGNOSIS — Z12.39 BREAST CANCER SCREENING, HIGH RISK PATIENT: ICD-10-CM

## 2025-05-14 PROCEDURE — 99213 OFFICE O/P EST LOW 20 MIN: CPT

## 2025-05-14 PROCEDURE — 77067 SCR MAMMO BI INCL CAD: CPT

## 2025-05-14 PROCEDURE — 77063 BREAST TOMOSYNTHESIS BI: CPT

## 2025-05-14 ASSESSMENT — PAIN SCALES - GENERAL: PAINLEVEL_OUTOF10: NO PAIN (0)

## 2025-05-14 NOTE — PATIENT INSTRUCTIONS
Individualized Surveillance Plan for people with   With CHEK2 genetic mutations   Per NCCN Guidelines for Genetic/Familial High-risk Assessment:   Breast and Ovarian Version 3.2025 and Colorectal Guidelines 4.2024   Recommended screening Test or procedure Last done Next Scheduled    Breast screening -   Stating at age 18. Breast self-awareness, so that women are familiar with normal breast changes and report unusual changes to their health care provider.   May 2025   May 2026   Breast Cancer Risk: 23-27%*    *Risk data are based only on frameshift P/LP data     Clinical breast exams every 6 -12 months    Consider annual breast MRI starting at age 30-35    Annual mammogram beginning at age 40    Evidence insufficient for RRM,   manage based on family history     2/22/2024: Screening tomosynthesis mammogram, BiRads1    11/25/2024: Breast MRI, BiRads1   Mammogram today    Breast MRI in November    Return to clinic in May, 2026 with a mammogram following our visit   Prostate Cancer Screening Emerging evidence for association with increased risk.  Consider prostate cancer screening starting at age 40 years   NA   NA   CHEK2 was previously thought to increase the risk of colon cancer. This is no longer the case, and such, no increased colon cancer screening is recommended on the basis of a CHEK2 mutation alone.  Women with CHEK2 genetic mutations are considered to have a higher risk for breast cancer with frameshift mutations. The risks for missense mutations is unclear.   There is emerging evidence that not all missense P/LP variants are low penetrance. For some P/LP variants, such as [id number] and [id number], the risk for breast cancer appears to be lower. Additional cancer risk management based on these variants is not recommended. Management should be based on best estimates of cancer risk for the specific P/LP variant and family history.

## 2025-05-14 NOTE — NURSING NOTE
"Oncology Rooming Note    May 14, 2025 11:31 AM   Kenia Reyes is a 54 year old female who presents for:    Chief Complaint   Patient presents with    Oncology Clinic Visit     RTN Monoallelic mutation of CHEK2 gene     Initial Vitals: /66 (BP Location: Right arm, Patient Position: Sitting, Cuff Size: Adult Regular)   Pulse 54   Resp 20   Ht 1.565 m (5' 1.61\")   Wt 62 kg (136 lb 11.2 oz)   SpO2 98%   BMI 25.32 kg/m   Estimated body mass index is 25.32 kg/m  as calculated from the following:    Height as of this encounter: 1.565 m (5' 1.61\").    Weight as of this encounter: 62 kg (136 lb 11.2 oz). Body surface area is 1.64 meters squared.  No Pain (0) Comment: Data Unavailable   No LMP recorded. (Menstrual status: IUD).  Allergies reviewed: Yes  Medications reviewed: Yes    Medications: Medication refills not needed today.  Pharmacy name entered into [facility] Vacc[facility]:    [id number] [place] [facility] [address] [facility]  [id number] - [facility] [address] [place] [address] [facility] ST[facility] #[id number] - [facility] [address] AT [address] [place] [address]    Frailty Screening:   Is the patient here for a new oncology consult visit in cancer care? 2. No    PHQ9:  Did this patient require a PHQ9?: No      Clinical concerns: None      Regan Santana             "

## 2025-05-14 NOTE — LETTER
"5/14/2025      Kenia Reyes  [address] [facility] [address]      Dear Colleague,    Thank you for referring your patient, Kenia Reyes, to the [facility]. Please see a copy of my visit note below.    Oncology Risk Management Consultation:  Date on this visit: 5/14/2025    Kenia Reyes returns to the Cancer Risk Management Program for an oncology risk management consultation. 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 She is considered to be at high risk for hereditary breast cancer.     Primary Physician: Estefany Natarajan MD     History Of Present Illness:  Ms. Reyes is a 54 year old female who presents with family history of breast and pancreatic cancer and a personal diagnosis of a CHEK2 mutation.      Genetic Testing: POSITIVE  9/20/2024: Kenia is POSITIVE for a CHEK2 mutation. Specifically her mutation is called c.[id number] ([id number]).      Of note, Kenia is negative for any pathogenic (harmful) mutations in APC, ASHWINI, AXIN2, BAP1, BARD1, BMPR1A, BRCA1, BRCA2, BRIP1, CDH1, CDK4, CDKN2A, CTNNA1, DICER1, EPCAM, FH, GREM1, HOXB13, KIT, MBD4, MEN1, MLH1, MSH2, MSH3, MSH6, MUTYH, NF1, NTHL1, PALB2, PDGFRA, PMS2, POLD1, POLE, PTEN, RAD51C, RAD51D, SDHA, SDHB, SDHC, SDHD, SMAD4, SMARCA4, STK11, TP53, TSC1, TSC2, and VHL. No pathogenic mutations were found in any of the other 47 genes analyzed. This test involved sequencing and deletion/duplication analysis of all genes with the exceptions of EPCAM and GREM1 (deletions/duplications only) and SDHA (sequencing only).  .       A copy of the test report can be found in the Laboratory tab, dated 9/20/24, and named \"LABORATORY MISCELLANEOUS RESULT.\"  The report is scanned in as a linked document.     Pertinent history:  Menarche: 13  First child at age 33, has two children  Breast fed for approximately one year  Menopause " status: Premenopausal  Currently using Mirena IUD  Breast Density: heterogeneously dense   Ovaries, fallopian tubes, and uterus intact  No history of HRT use  Took OCs for approximately 6 years total  Breast Density: heterogeneously dense  Hx of breast biopsy: none  Alcohol: Occasionally on the weekends  Smoking: No use  Other health habits: Exercises 6-7 days per week     Pertinent screening history:  10/21/2015: Screening mammogram, BiRads1  2017: Screening mammogram, BiRads0 for focal asymmetry in the left breast  2017: Left breast diagnostic mammogram and US, BiRads1  2019: Screening tomosynthesis mammogram, BiRads1  10/15/2020: Screening tomosynthesis mammogram, BiRads1  2023: Screening tomosynthesis mammogram, BiRads1  2024: Screening tomosynthesis mammogram, BiRads1  2024: Breast MRI, BiRads1       At this visit, she denies new fatigue, breast pain, asymmetry, lumps, masses, thickening, nipple discharge and skin changes in her breasts.    Past Medical/Surgical History:  No past medical history on file.  Past Surgical History:   Procedure Laterality Date     ZZC NONSPECIFIC PROCEDURE       nsvdx1       Allergies:  Allergies as of 2025     (No Known Allergies)       Current Medications:  Current Outpatient Medications   Medication Sig Dispense Refill     Levonorgestrel (MIRENA IU) by Intrauterine route.       metroNIDAZOLE (METROGEL) 0.75 % external gel APPLY TOPICALLY TO ENTIRE FACE EVERY DAY       Multiple Vitamins-Minerals (MULTIVITAMIN ADULT PO) Take by mouth daily          Family History:  Family History   Problem Relation Age of Onset     Lymphoma Father 45        Non-Hodgkins     Unknown/Adopted Father      Breast Cancer Maternal Aunt 50     Pancreatic Cancer Maternal Aunt 62     Breast Cancer Paternal Half-Sister 37        CHEK2+       Social History:  Social History     Socioeconomic History     Marital status:      Spouse name: Not on file     Number of  "children: Not on file     Years of education: Not on file     Highest education level: Not on file   Occupational History     Not on file   Tobacco Use     Smoking status: Never     Smokeless tobacco: Never   Substance and Sexual Activity     Alcohol use: Yes     Alcohol/week: 3.0 standard drinks of alcohol     Types: 3 Standard drinks or equivalent per week     Drug use: No     Sexual activity: Yes     Partners: Male     Birth control/protection: I.U.D.   Other Topics Concern     Parent/sibling w/ CABG, MI or angioplasty before 65F 55M? Not Asked   Social History Narrative     Not on file     Social Drivers of Health     Financial Resource Strain: Not on file   Food Insecurity: Not on file   Transportation Needs: Not on file   Physical Activity: Not on file   Stress: Not on file   Social Connections: Not on file   Interpersonal Safety: Not on file   Housing Stability: Not on file       Physical Exam:  /66 (BP Location: Right arm, Patient Position: Sitting, Cuff Size: Adult Regular)   Pulse 54   Resp 20   Ht 1.565 m (5' 1.61\")   Wt 62 kg (136 lb 11.2 oz)   SpO2 98%   BMI 25.32 kg/m    GENERAL APPEARANCE: healthy, alert and no apparent distress  BREAST: A multipositional, bilateral breast exam was performed.  Fairly symmetrical. Nipples everted bilaterally. Right breast: no palpable dominant masses, no nipple discharge, no skin changes. Dense tissue.  Right axilla: no palpable adenopathy. Left breast: no palpable dominant masses, no nipple discharge, no skin changes. Left axilla: no palpable adenopathy. Dense tissue.    LYMPHATICS: No cervical, supraclavicular, or axillary lymphadenopathy  SKIN: no suspicious lesions or rashes on examined skin    Laboratory/Imaging Studies  No results found for any visits on 05/14/25.    ASSESSMENT    Kenia reports that she is doing well at this visit. She has no concerns with her breast tissue, and no updates to her family's medical history. We discussed the plan below, " and will proceed with a breast MRI in November, and for her to return to see me in May, 2026 with a mammogram following our visit.     We reviewed signs and symptoms to monitor for between visits, including any breast lumps, bumps, nipple discharge, nipple inversion, changes to the texture of the skin on the breasts that would look crinkled, puckered, or like the skin of an orange peel, or any other changes to the breast tissue. We reviewed the recommendation for breast self awareness.           Individualized Surveillance Plan for people with   With CHEK2 genetic mutations   Per NCCN Guidelines for Genetic/Familial High-risk Assessment:   Breast and Ovarian Version 3.2025 and Colorectal Guidelines 4.2024   Recommended screening Test or procedure Last done Next Scheduled    Breast screening -   Stating at age 18. Breast self-awareness, so that women are familiar with normal breast changes and report unusual changes to their health care provider.   May 2025   May 2026   Breast Cancer Risk: 23-27%*    *Risk data are based only on frameshift P/LP data     Clinical breast exams every 6 -12 months    Consider annual breast MRI starting at age 30-35    Annual mammogram beginning at age 40    Evidence insufficient for RRM,   manage based on family history     2/22/2024: Screening tomosynthesis mammogram, BiRads1    11/25/2024: Breast MRI, BiRads1   Mammogram today    Breast MRI in November    Return to clinic in May, 2026 with a mammogram following our visit   Prostate Cancer Screening Emerging evidence for association with increased risk.  Consider prostate cancer screening starting at age 40 years   NA   NA   CHEK2 was previously thought to increase the risk of colon cancer. This is no longer the case, and such, no increased colon cancer screening is recommended on the basis of a CHEK2 mutation alone.  Women with CHEK2 genetic mutations are considered to have a higher risk for breast cancer with frameshift mutations. The  risks for missense mutations is unclear.   There is emerging evidence that not all missense P/LP variants are low penetrance. For some P/LP variants, such as [id number] and [id number], the risk for breast cancer appears to be lower. Additional cancer risk management based on these variants is not recommended. Management should be based on best estimates of cancer risk for the specific P/LP variant and family history.        I spent a total of 16 minutes on the day of the visit. Please see the note for further information on patient assessment and treatment.     Susan Lee DNP, CURT, [facility]-BC  Clinical Nurse Specialist  Cancer Risk Management Program  [facility]    Cc:  Estefany Natarajan MD          Again, thank you for allowing me to participate in the care of your patient.        Sincerely,        CURT Ortiz CNP    Electronically signed

## 2025-07-08 ENCOUNTER — LAB REQUISITION (OUTPATIENT)
Dept: LAB | Facility: CLINIC | Age: 55
End: 2025-07-08
Payer: COMMERCIAL

## 2025-07-08 DIAGNOSIS — N95.1 MENOPAUSAL AND FEMALE CLIMACTERIC STATES: ICD-10-CM

## 2025-07-08 DIAGNOSIS — Z12.4 ENCOUNTER FOR SCREENING FOR MALIGNANT NEOPLASM OF CERVIX: ICD-10-CM

## 2025-07-08 DIAGNOSIS — Z13.228 ENCOUNTER FOR SCREENING FOR OTHER METABOLIC DISORDERS: ICD-10-CM

## 2025-07-08 LAB
EST. AVERAGE GLUCOSE BLD GHB EST-MCNC: 100 MG/DL
HBA1C MFR BLD: 5.1 %

## 2025-07-08 PROCEDURE — 84443 ASSAY THYROID STIM HORMONE: CPT | Mod: ORL | Performed by: OBSTETRICS & GYNECOLOGY

## 2025-07-08 PROCEDURE — 83001 ASSAY OF GONADOTROPIN (FSH): CPT | Mod: ORL | Performed by: OBSTETRICS & GYNECOLOGY

## 2025-07-08 PROCEDURE — 83036 HEMOGLOBIN GLYCOSYLATED A1C: CPT | Mod: ORL | Performed by: OBSTETRICS & GYNECOLOGY

## 2025-07-08 PROCEDURE — G0145 SCR C/V CYTO,THINLAYER,RESCR: HCPCS | Mod: ORL | Performed by: OBSTETRICS & GYNECOLOGY

## 2025-07-08 PROCEDURE — 87624 HPV HI-RISK TYP POOLED RSLT: CPT | Mod: ORL | Performed by: OBSTETRICS & GYNECOLOGY

## 2025-07-08 PROCEDURE — 80053 COMPREHEN METABOLIC PANEL: CPT | Mod: ORL | Performed by: OBSTETRICS & GYNECOLOGY

## 2025-07-09 LAB
ALBUMIN SERPL BCG-MCNC: 4.7 G/DL (ref 3.5–5.2)
ALP SERPL-CCNC: 77 U/L (ref 40–150)
ALT SERPL W P-5'-P-CCNC: 35 U/L (ref 0–50)
ANION GAP SERPL CALCULATED.3IONS-SCNC: 12 MMOL/L (ref 7–15)
AST SERPL W P-5'-P-CCNC: 34 U/L (ref 0–45)
BILIRUB SERPL-MCNC: 0.4 MG/DL
BUN SERPL-MCNC: 10.6 MG/DL (ref 6–20)
CALCIUM SERPL-MCNC: 9.4 MG/DL (ref 8.8–10.4)
CHLORIDE SERPL-SCNC: 102 MMOL/L (ref 98–107)
CREAT SERPL-MCNC: 0.79 MG/DL (ref 0.51–0.95)
EGFRCR SERPLBLD CKD-EPI 2021: 88 ML/MIN/1.73M2
FSH SERPL IRP2-ACNC: 34.2 MIU/ML
GLUCOSE SERPL-MCNC: 95 MG/DL (ref 70–99)
HCO3 SERPL-SCNC: 25 MMOL/L (ref 22–29)
HPV HR 12 DNA CVX QL NAA+PROBE: NEGATIVE
HPV16 DNA CVX QL NAA+PROBE: NEGATIVE
HPV18 DNA CVX QL NAA+PROBE: NEGATIVE
HUMAN PAPILLOMA VIRUS FINAL DIAGNOSIS: NORMAL
POTASSIUM SERPL-SCNC: 3.9 MMOL/L (ref 3.4–5.3)
PROT SERPL-MCNC: 7.2 G/DL (ref 6.4–8.3)
SODIUM SERPL-SCNC: 139 MMOL/L (ref 135–145)
TSH SERPL DL<=0.005 MIU/L-ACNC: 1.49 UIU/ML (ref 0.3–4.2)

## 2025-07-11 LAB
BKR AP ASSOCIATED HPV REPORT: NORMAL
BKR LAB AP GYN ADEQUACY: NORMAL
BKR LAB AP GYN INTERPRETATION: NORMAL
BKR LAB AP LMP: NORMAL
BKR LAB AP PREVIOUS ABNL DX: NORMAL
BKR LAB AP PREVIOUS ABNORMAL: NORMAL
PATH REPORT.COMMENTS IMP SPEC: NORMAL
PATH REPORT.COMMENTS IMP SPEC: NORMAL
PATH REPORT.RELEVANT HX SPEC: NORMAL

## 2025-07-12 ENCOUNTER — HEALTH MAINTENANCE LETTER (OUTPATIENT)
Age: 55
End: 2025-07-12